# Patient Record
Sex: FEMALE | Race: WHITE | NOT HISPANIC OR LATINO | Employment: OTHER | URBAN - METROPOLITAN AREA
[De-identification: names, ages, dates, MRNs, and addresses within clinical notes are randomized per-mention and may not be internally consistent; named-entity substitution may affect disease eponyms.]

---

## 2020-09-04 ENCOUNTER — APPOINTMENT (OUTPATIENT)
Dept: RADIOLOGY | Facility: MEDICAL CENTER | Age: 65
End: 2020-09-04
Attending: EMERGENCY MEDICINE
Payer: MEDICARE

## 2020-09-04 ENCOUNTER — HOSPITAL ENCOUNTER (EMERGENCY)
Facility: MEDICAL CENTER | Age: 65
End: 2020-09-04
Attending: EMERGENCY MEDICINE
Payer: MEDICARE

## 2020-09-04 VITALS
TEMPERATURE: 98.2 F | BODY MASS INDEX: 20.31 KG/M2 | OXYGEN SATURATION: 96 % | DIASTOLIC BLOOD PRESSURE: 72 MMHG | HEIGHT: 63 IN | HEART RATE: 67 BPM | WEIGHT: 114.64 LBS | SYSTOLIC BLOOD PRESSURE: 139 MMHG | RESPIRATION RATE: 13 BRPM

## 2020-09-04 DIAGNOSIS — Z20.822 SUSPECTED COVID-19 VIRUS INFECTION: ICD-10-CM

## 2020-09-04 LAB
COVID ORDER STATUS COVID19: NORMAL
EKG IMPRESSION: NORMAL
SARS-COV-2 RNA RESP QL NAA+PROBE: DETECTED
SPECIMEN SOURCE: ABNORMAL

## 2020-09-04 PROCEDURE — A9270 NON-COVERED ITEM OR SERVICE: HCPCS | Performed by: EMERGENCY MEDICINE

## 2020-09-04 PROCEDURE — 93005 ELECTROCARDIOGRAM TRACING: CPT | Performed by: EMERGENCY MEDICINE

## 2020-09-04 PROCEDURE — 99284 EMERGENCY DEPT VISIT MOD MDM: CPT

## 2020-09-04 PROCEDURE — 71045 X-RAY EXAM CHEST 1 VIEW: CPT

## 2020-09-04 PROCEDURE — U0003 INFECTIOUS AGENT DETECTION BY NUCLEIC ACID (DNA OR RNA); SEVERE ACUTE RESPIRATORY SYNDROME CORONAVIRUS 2 (SARS-COV-2) (CORONAVIRUS DISEASE [COVID-19]), AMPLIFIED PROBE TECHNIQUE, MAKING USE OF HIGH THROUGHPUT TECHNOLOGIES AS DESCRIBED BY CMS-2020-01-R: HCPCS

## 2020-09-04 PROCEDURE — 700102 HCHG RX REV CODE 250 W/ 637 OVERRIDE(OP): Performed by: EMERGENCY MEDICINE

## 2020-09-04 PROCEDURE — C9803 HOPD COVID-19 SPEC COLLECT: HCPCS | Performed by: EMERGENCY MEDICINE

## 2020-09-04 RX ORDER — ONDANSETRON 4 MG/1
4 TABLET, ORALLY DISINTEGRATING ORAL EVERY 6 HOURS PRN
COMMUNITY
End: 2020-09-06

## 2020-09-04 RX ORDER — BUSPIRONE HYDROCHLORIDE 5 MG/1
10 TABLET ORAL 3 TIMES DAILY
COMMUNITY

## 2020-09-04 RX ORDER — DEXAMETHASONE 4 MG/1
8 TABLET ORAL ONCE
Status: COMPLETED | OUTPATIENT
Start: 2020-09-04 | End: 2020-09-04

## 2020-09-04 RX ORDER — CHOLECALCIFEROL (VITAMIN D3) 125 MCG
5000 CAPSULE ORAL DAILY
COMMUNITY

## 2020-09-04 RX ORDER — GUAIFENESIN 600 MG/1
600 TABLET, EXTENDED RELEASE ORAL EVERY 4 HOURS PRN
COMMUNITY
End: 2020-10-12

## 2020-09-04 RX ORDER — ALBUTEROL SULFATE 90 UG/1
2 AEROSOL, METERED RESPIRATORY (INHALATION) EVERY 6 HOURS PRN
COMMUNITY
End: 2020-10-12

## 2020-09-04 RX ADMIN — DEXAMETHASONE 8 MG: 4 TABLET ORAL at 17:18

## 2020-09-04 NOTE — ED PROVIDER NOTES
ED Provider  Scribed for Jv Sun D.O. by Anna Bhat. 9/4/2020  1:49 PM    Means of arrival:Walk in   History obtained from:Patient   History limited by: None     CHIEF COMPLAINT  Chief Complaint   Patient presents with   • Shortness of Breath   • Cough   • Cramping   • Rib Pain     right   • Fever     100.2 at home last night       HPI  Irma Vicente is a 65 y.o. female who presents with a cough onset 6 days ago. The patient states that her  tested positive for coronavirus 4 days ago. She reports associated shortness of breath, generalized body aches, fever, right-sided chest pain, and leg cramping. She denies vomiting. She also denies a history of heart or lung disease. She says that she has not had a coronavirus test done.     REVIEW OF SYSTEMS  See HPI for further details.    PAST MEDICAL HISTORY   has a past medical history of Anxiety, Constipation, DVT (deep venous thrombosis) (HCC), May-Thurner syndrome, and Osteoporosis.    SOCIAL HISTORY  Social History     Tobacco Use   • Smoking status: Never Smoker   • Smokeless tobacco: Never Used   Substance and Sexual Activity   • Alcohol use: Not Currently   • Drug use: Not Currently   • Sexual activity: Not noted        SURGICAL HISTORY  patient denies any surgical history    CURRENT MEDICATIONS  Home Medications     Reviewed by Ambreen De La Garza R.N. (Registered Nurse) on 09/04/20 at 1307  Med List Status: Partial   Medication Last Dose Status   Acetylcysteine (N-ACETYL-L-CYSTEINE) 600 MG Cap 9/4/2020 Active   albuterol 108 (90 Base) MCG/ACT Aero Soln inhalation aerosol prn Active   aspirin EC (ECOTRIN) 81 MG Tablet Delayed Response 9/4/2020 Active   busPIRone (BUSPAR) 5 MG tablet 9/4/2020 Active   cholecalciferol (D-3-5) 5000 UNIT Cap 9/4/2020 Active   guaiFENesin ER (MUCINEX) 600 MG TABLET SR 12 HR 9/4/2020 Active   ondansetron (ZOFRAN ODT) 4 MG TABLET DISPERSIBLE 9/4/2020 Active                ALLERGIES  No Known Allergies    PHYSICAL  "EXAM  VITAL SIGNS: /86   Pulse 75   Temp 37.2 °C (99 °F) (Temporal)   Resp 20   Ht 1.6 m (5' 3\")   Wt 52 kg (114 lb 10.2 oz)   SpO2 94%   BMI 20.31 kg/m²   Constitutional: Alert in no apparent distress.  HENT: No signs of trauma, mucous membranes are moist  Eyes: Conjunctiva normal, Non-icteric.   Neck: Normal range of motion, No tenderness, Supple.  Lymphatic: No lymphadenopathy noted.   Cardiovascular: Regular rate and rhythm, no murmurs.   Thorax & Lungs: Normal breath sounds, No respiratory distress, No wheezing, No chest tenderness.   Abdomen: Bowel sounds normal, Soft, No tenderness, No masses, No pulsatile masses. No peritoneal signs.  Skin: Warm, Dry, normal color.   Back: No bony tenderness, No CVA tenderness.   Extremities: No edema, No tenderness, No cyanosis  Musculoskeletal: Good range of motion in all major joints. No tenderness to palpation or major deformities noted.   Neurologic: Alert and oriented x4, Normal motor function, Normal sensory function, No focal deficits noted.   Psychiatric: Affect normal, Judgment normal, Mood normal.       DIAGNOSTIC STUDIES / PROCEDURES    EKG  12 Lead EKG interpreted by me shown below.      LABS  Results for orders placed or performed during the hospital encounter of 20   COVID/SARS CoV-2 PCR    Specimen: Nasopharyngeal; Respirate   Result Value Ref Range    COVID Order Status Received    SARS-CoV-2, PCR (In-House)   Result Value Ref Range    SARS-CoV-2 Source NP Swab     SARS-CoV-2 by PCR DETECTED (AA)    EKG   Result Value Ref Range    Report       [facility] Emergency Dept.    Test Date:  2020  Pt Name:    CASSIE CRYSTAL                Department: ER  MRN:        [id number]                      Room:  Gender:     Female                       Technician: [id number]  :        1955                   Requested By:ER TRIAGE PROTOCOL  Order #:    [id number]                    Reading MD: TRACY NOONAN, " MELVI    Measurements  Intervals                                Axis  Rate:       68                           P:          62  VA:         183                          QRS:        73  QRSD:       77                           T:          66  QT:         390  QTc:        415    Interpretive Statements  Sinus rhythm  No previous ECG available for comparison  Electronically Signed On 9-4-2020 17:22:37 PDT by JV NOONAN D.O.        All labs reviewed by me.    RADIOLOGY  DX-CHEST-PORTABLE (1 VIEW)   Final Result      No acute cardiopulmonary disease.        The radiologist's interpretations of all radiological studies have been reviewed by me.    Films have been independently by me      COURSE  Pertinent Labs & Imaging studies reviewed. (See chart for details)    1:49 PM - Patient seen and examined at bedside. Discussed plan of care. Ordered for DX-Chest and COVID/SARS CoV-2 to evaluate her symptoms.     PPE Note: I personally donned full PPE for all patient encounters during this visit, including being clean-shaven with an N95 respirator mask, gloves, gown, and goggles.     Scribe remained outside the patient's room and did not have any contact with the patient for the duration of patient encounter.     MEDICAL DECISION MAKING  This is a 65 y.o. female who presents with cough, subjective fevers, and she says she has some shortness of breath.  She has body aches and some chest discomfort.  Her evaluation is positive for coronavirus, her  was tested positive.  She is not hypoxic, her pulse oximetry is been normal she has no respiratory distress she has no pneumonia on x-ray.  With this she is stable for discharge home.  Dexamethasone was given.          FINAL IMPRESSION  1. Suspected COVID-19 virus infection         Anna GRANDE), am scribing for, and in the presence of, Jv Noonan D.O..    Electronically signed by: Anna Jolley), 9/4/2020    Jv GRANDE D.O.  personally performed the services described in this documentation, as scribed by Anna Bhat in my presence, and it is both accurate and complete. E    The note accurately reflects work and decisions made by me.  Jv Sun D.O.  9/4/2020  5:36 PM

## 2020-09-04 NOTE — ED NOTES
Pt ambulatory to GRN 23. Pt ambulatory to restroom and given clean catch instructions. Pt changed into gown and placed on monitor.  Agree with triage note.   Chart up and ready for ERP now.

## 2020-09-04 NOTE — ED TRIAGE NOTES
Pt ambulated to triage with   Chief Complaint   Patient presents with   • Shortness of Breath   • Cough   • Cramping   • Rib Pain     right   • Fever     100.2 at home last night     Pt symptoms started Saturday, positive for covid, pt been talking with PCP and albuterol started but pt hasn't started to take it yet.  Pt reports sharp in right side of chest when taking a deep breath and some chest tightness.   Called for EKG.  Pt Informed regarding triage process and verbalized understanding to inform triage tech or RN for any changes in condition. Placed in [facility] after EKG.

## 2020-09-04 NOTE — ED NOTES
Lab called with critical result of COVID-19 POSITIVE. Critical lab result read back to .   Dr. Sun notified of critical lab result at 1648.  Critical lab result read back by Dr. Sun.

## 2020-09-05 NOTE — ED NOTES
Pt discharged home. Explained discharge and medication instructions. Questions and comments addressed. Pt verbalized understanding of instructions. Pt advised to follow-up with PCP as needed or return to ED for any new or worsening of symptoms. Pt is ambulating well and steady on feet. VS stable.

## 2020-09-05 NOTE — DISCHARGE INSTRUCTIONS
Your COVID test is positive.  Your oxygen levels are good and you have no respiratory distress and a chest x-ray is normal.  Currently this is a mild case but this could change at any time.  Return if your symptoms worsen for reevaluation.    In the meantime you need to self isolate do not expose yourself to anyone else.,  As your  already has COVID exposing yourself to him would not be detrimental.

## 2020-09-06 ENCOUNTER — APPOINTMENT (OUTPATIENT)
Dept: RADIOLOGY | Facility: MEDICAL CENTER | Age: 65
End: 2020-09-06
Attending: EMERGENCY MEDICINE
Payer: MEDICARE

## 2020-09-06 ENCOUNTER — HOSPITAL ENCOUNTER (EMERGENCY)
Facility: MEDICAL CENTER | Age: 65
End: 2020-09-06
Attending: EMERGENCY MEDICINE
Payer: MEDICARE

## 2020-09-06 VITALS
RESPIRATION RATE: 22 BRPM | DIASTOLIC BLOOD PRESSURE: 80 MMHG | WEIGHT: 113 LBS | HEIGHT: 63 IN | OXYGEN SATURATION: 99 % | SYSTOLIC BLOOD PRESSURE: 139 MMHG | BODY MASS INDEX: 20.02 KG/M2 | HEART RATE: 76 BPM | TEMPERATURE: 97.1 F

## 2020-09-06 DIAGNOSIS — J12.82 PNEUMONIA DUE TO COVID-19 VIRUS: ICD-10-CM

## 2020-09-06 DIAGNOSIS — R07.9 CHEST PAIN, UNSPECIFIED TYPE: ICD-10-CM

## 2020-09-06 DIAGNOSIS — U07.1 PNEUMONIA DUE TO COVID-19 VIRUS: ICD-10-CM

## 2020-09-06 LAB
ALBUMIN SERPL BCP-MCNC: 4.1 G/DL (ref 3.2–4.9)
ALBUMIN/GLOB SERPL: 1.5 G/DL
ALP SERPL-CCNC: 91 U/L (ref 30–99)
ALT SERPL-CCNC: 34 U/L (ref 2–50)
ANION GAP SERPL CALC-SCNC: 15 MMOL/L (ref 7–16)
AST SERPL-CCNC: 51 U/L (ref 12–45)
BASOPHILS # BLD AUTO: 0.2 % (ref 0–1.8)
BASOPHILS # BLD: 0.01 K/UL (ref 0–0.12)
BILIRUB SERPL-MCNC: 0.3 MG/DL (ref 0.1–1.5)
BUN SERPL-MCNC: 13 MG/DL (ref 8–22)
CALCIUM SERPL-MCNC: 9.1 MG/DL (ref 8.5–10.5)
CHLORIDE SERPL-SCNC: 90 MMOL/L (ref 96–112)
CO2 SERPL-SCNC: 22 MMOL/L (ref 20–33)
CREAT SERPL-MCNC: 0.62 MG/DL (ref 0.5–1.4)
EKG IMPRESSION: NORMAL
EOSINOPHIL # BLD AUTO: 0.01 K/UL (ref 0–0.51)
EOSINOPHIL NFR BLD: 0.2 % (ref 0–6.9)
ERYTHROCYTE [DISTWIDTH] IN BLOOD BY AUTOMATED COUNT: 41.9 FL (ref 35.9–50)
GLOBULIN SER CALC-MCNC: 2.7 G/DL (ref 1.9–3.5)
GLUCOSE SERPL-MCNC: 111 MG/DL (ref 65–99)
HCT VFR BLD AUTO: 43.3 % (ref 37–47)
HGB BLD-MCNC: 15.1 G/DL (ref 12–16)
IMM GRANULOCYTES # BLD AUTO: 0.01 K/UL (ref 0–0.11)
IMM GRANULOCYTES NFR BLD AUTO: 0.2 % (ref 0–0.9)
LYMPHOCYTES # BLD AUTO: 0.52 K/UL (ref 1–4.8)
LYMPHOCYTES NFR BLD: 10.7 % (ref 22–41)
MCH RBC QN AUTO: 32.6 PG (ref 27–33)
MCHC RBC AUTO-ENTMCNC: 34.9 G/DL (ref 33.6–35)
MCV RBC AUTO: 93.5 FL (ref 81.4–97.8)
MONOCYTES # BLD AUTO: 0.23 K/UL (ref 0–0.85)
MONOCYTES NFR BLD AUTO: 4.7 % (ref 0–13.4)
NEUTROPHILS # BLD AUTO: 4.08 K/UL (ref 2–7.15)
NEUTROPHILS NFR BLD: 84 % (ref 44–72)
NRBC # BLD AUTO: 0 K/UL
NRBC BLD-RTO: 0 /100 WBC
PLATELET # BLD AUTO: 193 K/UL (ref 164–446)
PMV BLD AUTO: 10.1 FL (ref 9–12.9)
POTASSIUM SERPL-SCNC: 4.3 MMOL/L (ref 3.6–5.5)
PROT SERPL-MCNC: 6.8 G/DL (ref 6–8.2)
RBC # BLD AUTO: 4.63 M/UL (ref 4.2–5.4)
SODIUM SERPL-SCNC: 127 MMOL/L (ref 135–145)
WBC # BLD AUTO: 4.9 K/UL (ref 4.8–10.8)

## 2020-09-06 PROCEDURE — 700117 HCHG RX CONTRAST REV CODE 255: Performed by: EMERGENCY MEDICINE

## 2020-09-06 PROCEDURE — 80053 COMPREHEN METABOLIC PANEL: CPT

## 2020-09-06 PROCEDURE — 700105 HCHG RX REV CODE 258: Performed by: EMERGENCY MEDICINE

## 2020-09-06 PROCEDURE — 36415 COLL VENOUS BLD VENIPUNCTURE: CPT

## 2020-09-06 PROCEDURE — 93970 EXTREMITY STUDY: CPT

## 2020-09-06 PROCEDURE — 99285 EMERGENCY DEPT VISIT HI MDM: CPT

## 2020-09-06 PROCEDURE — 85025 COMPLETE CBC W/AUTO DIFF WBC: CPT

## 2020-09-06 PROCEDURE — 93970 EXTREMITY STUDY: CPT | Mod: 26 | Performed by: INTERNAL MEDICINE

## 2020-09-06 PROCEDURE — 71275 CT ANGIOGRAPHY CHEST: CPT

## 2020-09-06 PROCEDURE — 700111 HCHG RX REV CODE 636 W/ 250 OVERRIDE (IP): Performed by: EMERGENCY MEDICINE

## 2020-09-06 PROCEDURE — 93005 ELECTROCARDIOGRAM TRACING: CPT

## 2020-09-06 PROCEDURE — 93005 ELECTROCARDIOGRAM TRACING: CPT | Performed by: EMERGENCY MEDICINE

## 2020-09-06 PROCEDURE — 96374 THER/PROPH/DIAG INJ IV PUSH: CPT

## 2020-09-06 RX ORDER — ONDANSETRON 4 MG/1
4 TABLET, ORALLY DISINTEGRATING ORAL EVERY 8 HOURS PRN
Qty: 10 TAB | Refills: 0 | Status: SHIPPED | OUTPATIENT
Start: 2020-09-06 | End: 2020-10-12

## 2020-09-06 RX ORDER — ONDANSETRON 4 MG/1
4 TABLET, ORALLY DISINTEGRATING ORAL EVERY 8 HOURS PRN
Qty: 10 TAB | Refills: 0 | Status: SHIPPED | OUTPATIENT
Start: 2020-09-06 | End: 2020-09-06 | Stop reason: SDUPTHER

## 2020-09-06 RX ORDER — ONDANSETRON 2 MG/ML
4 INJECTION INTRAMUSCULAR; INTRAVENOUS ONCE
Status: COMPLETED | OUTPATIENT
Start: 2020-09-06 | End: 2020-09-06

## 2020-09-06 RX ORDER — SODIUM CHLORIDE 9 MG/ML
1000 INJECTION, SOLUTION INTRAVENOUS ONCE
Status: COMPLETED | OUTPATIENT
Start: 2020-09-06 | End: 2020-09-06

## 2020-09-06 RX ADMIN — IOHEXOL 60 ML: 350 INJECTION, SOLUTION INTRAVENOUS at 13:58

## 2020-09-06 RX ADMIN — ONDANSETRON 4 MG: 2 INJECTION INTRAMUSCULAR; INTRAVENOUS at 13:00

## 2020-09-06 RX ADMIN — SODIUM CHLORIDE 1000 ML: 9 INJECTION, SOLUTION INTRAVENOUS at 15:23

## 2020-09-06 ASSESSMENT — PAIN DESCRIPTION - PAIN TYPE: TYPE: ACUTE PAIN

## 2020-09-06 NOTE — ED NOTES
Pt ambulated to restroom with one assist. Pt then was transported to CT for imaging. Pt reported zofran helped with nausea

## 2020-09-06 NOTE — ED PROVIDER NOTES
ED Provider Note    This patient was cared for during the COVID-19 pandemic. History and physical exam may be limited/truncated by the inherent challenges of PPE and the need to decrease staff exposure to novel coronavirus. Some aspects of disease management may be different to protect staff and help slow the spread of disease. I verified that, if possible, the patient was wearing a mask and I was wearing appropriate PPE every time I encountered the patient.       CHIEF COMPLAINT  Chief Complaint   Patient presents with   • Chest Pain     x's 2 days on exhalation   • Chest Pressure     x's 2 days chest pressure and tightness constant. reports headache and non productive cough.    • Nausea     x's 3 days        HPI  Irma Vicente is a 65 y.o. female who presents with chest pain nausea.  She was seen 2 days ago and was diagnosed covid at that time.  Her  also is positive for covid.  She started to develop chest pain and pressure.  She has a history of DVT and history of May Thurner syndrome status post venous stents.  She is concerned given her chest pain for DVT and PE.  She has pain in her chest that is worse when she breathes.  It is located around the right side of her chest.  She denies any worsening lower extremity edema.        REVIEW OF SYSTEMS  positive for chest pain cough fevers, negative for vomiting. All other systems are negative.     PAST MEDICAL HISTORY   has a past medical history of Anxiety, Constipation, DVT (deep venous thrombosis) (HCC), May-Thurner syndrome, and Osteoporosis.    SOCIAL HISTORY  Social History     Tobacco Use   • Smoking status: Never Smoker   • Smokeless tobacco: Never Used   Substance and Sexual Activity   • Alcohol use: Not Currently   • Drug use: Not Currently   • Sexual activity: Not on file       SURGICAL HISTORY  patient denies any surgical history    CURRENT MEDICATIONS  Home Medications     Reviewed by Love Singh R.N. (Registered Nurse) on 09/06/20 at 1135   "Med List Status: Not Addressed   Medication Last Dose Status   Acetylcysteine (N-ACETYL-L-CYSTEINE) 600 MG Cap  Active   albuterol 108 (90 Base) MCG/ACT Aero Soln inhalation aerosol  Active   aspirin EC (ECOTRIN) 81 MG Tablet Delayed Response  Active   busPIRone (BUSPAR) 5 MG tablet  Active   cholecalciferol (D-3-5) 5000 UNIT Cap  Active   guaiFENesin ER (MUCINEX) 600 MG TABLET SR 12 HR  Active   ondansetron (ZOFRAN ODT) 4 MG TABLET DISPERSIBLE  Active                ALLERGIES  No Known Allergies    PHYSICAL EXAM  VITAL SIGNS: /80   Pulse 76   Temp 36.2 °C (97.1 °F)   Resp (!) 22   Ht 1.6 m (5' 3\")   Wt 51.3 kg (113 lb)   SpO2 99%   BMI 20.02 kg/m² .  Constitutional: Alert in no apparent distress.  HENT: No signs of trauma, Bilateral external ears normal, Nose normal.   Eyes: Pupils are equal and reactive, Conjunctiva normal, Non-icteric.   Neck: Normal range of motion, No tenderness, Supple, No stridor.   Cardiovascular: Regular rate and rhythm, no murmurs.   Thorax & Lungs: Normal breath sounds, No respiratory distress, No wheezing, No chest tenderness.   Abdomen: Bowel sounds normal, Soft, No tenderness, No masses, No peritoneal signs.  Skin: Warm, Dry, No erythema, No rash.   Musculoskeletal:  no major deformities noted.   Neurologic: Alert,  No focal deficits noted.   Psychiatric: Affect normal, Judgment normal, Mood normal.       DIAGNOSTIC STUDIES / PROCEDURES      EKG  Results for orders placed or performed during the hospital encounter of 20   EKG   Result Value Ref Range    Report       [facility] Emergency Dept.    Test Date:  2020  Pt Name:    CASSIE ROJAS                Department: ER  MRN:        [id number]                      Room:       [facility]  Gender:     Female                       Technician: [id number]  :        1955                   Requested By:ER TRIAGE PROTOCOL  Order #:    [id number]                    Alex MD: DEVYN " ALINA    Measurements  Intervals                                Axis  Rate:       65                           P:          48  FL:         188                          QRS:        71  QRSD:       76                           T:          57  QT:         380  QTc:        396    Interpretive Statements  SINUS RHYTHM  Compared to ECG 09/04/2020 14:39:21  No significant changes  No acute ischemia  Electronically Signed On 9-6-2020 16:14:10 PDT by DEVYN FULLER           LABS  Labs Reviewed   CBC WITH DIFFERENTIAL - Abnormal; Notable for the following components:       Result Value    Neutrophils-Polys 84.00 (*)     Lymphocytes 10.70 (*)     Lymphs (Absolute) 0.52 (*)     All other components within normal limits    Narrative:     Droplet, Contact, and Eye Protection   COMP METABOLIC PANEL - Abnormal; Notable for the following components:    Sodium 127 (*)     Chloride 90 (*)     Glucose 111 (*)     AST(SGOT) 51 (*)     All other components within normal limits    Narrative:     Droplet, Contact, and Eye Protection   ESTIMATED GFR    Narrative:     Droplet, Contact, and Eye Protection       RADIOLOGY  US-EXTREMITY VENOUS LOWER BILAT   Final Result      CT-CTA CHEST PULMONARY ARTERY W/ RECONS   Final Result      1.  No CT evidence for pulmonary emboli.   2.  Multifocal patchy groundglass opacities consistent with Covid 19 pneumonia.   3.  Hyperinflation, likely indicating COPD.                Medical records reviewed for continuity of care.  Patient was seen 2 days ago for cough.  She was covid positive at that time.    Differential Diagnosis includes but is not limited to: COVID-19 pneumonia, PE, DVT    COURSE & MEDICAL DECISION MAKING  Pertinent Labs & Imaging studies reviewed. (See chart for details)    This is a 65-year-old female that presents with chest pain.  She is known to be covid positive.  She is now 9 days into her symptoms.  She is on room air and is satting 95 to 96% on room air.  She is in no respiratory  distress.  She does have a history of DVT and PE secondary to may Thurner syndrome.  And therefore was concern for DVT/PE in the setting of this COVID-19 infection.    Labs are obtained she does appear to be slightly dehydrated and therefore she was given IV fluids.  CT of the chest and ultrasound of the lower extremities was performed to evaluate for DVT and PE given her high risk status and known COVID-19 infection I did not think d-dimer would be helpful in this situation.  CT and ultrasounds are negative for acute clot.  CT does show evidence of groundglass opacities consistent with COVID-19 pneumonia.  Again she is maintaining adequate oxygenation on room air.  She has not tachypneic.  I do not think she meets criteria for admission to the hospital for her COVID-19 pneumonia.  I recommended continued supportive care and I do think she can be discharged.    HYDRATION: Based on the patient's presentation of Dehydration the patient was given IV fluids. IV Hydration was used because oral hydration was not adequate alone. Upon recheck following hydration, the patient was improved.          FINAL IMPRESSION  1. Pneumonia due to COVID-19 virus     2. Chest pain, unspecified type         2.   3.    This dictation has been creating using voice recognition software. The accuracy of the dictation is limited the abilities of the software.  I expect there may be some errors of grammar and possibly content. I made every attempt to manually correct the errors within my dictation. However errors related to this voice recognition software may still exist and should be interpreted within the appropriate context.      The note accurately reflects work and decisions made by me.  Misty Jung M.D.  9/6/2020  4:16 PM

## 2020-09-06 NOTE — ED TRIAGE NOTES
..  Chief Complaint   Patient presents with   • Chest Pain     x's 2 days on exhalation   • Chest Pressure     x's 2 days chest pressure and tightness constant. reports headache and non productive cough.    • Nausea     x's 3 days        Pt was diagnosed with covid

## 2020-10-12 ENCOUNTER — OFFICE VISIT (OUTPATIENT)
Dept: PULMONOLOGY | Facility: HOSPICE | Age: 65
End: 2020-10-12
Payer: MEDICARE

## 2020-10-12 ENCOUNTER — HOSPITAL ENCOUNTER (OUTPATIENT)
Dept: RADIOLOGY | Facility: MEDICAL CENTER | Age: 65
End: 2020-10-12
Payer: MEDICARE

## 2020-10-12 VITALS
BODY MASS INDEX: 20.42 KG/M2 | SYSTOLIC BLOOD PRESSURE: 106 MMHG | OXYGEN SATURATION: 95 % | DIASTOLIC BLOOD PRESSURE: 76 MMHG | HEART RATE: 75 BPM | HEIGHT: 63 IN | WEIGHT: 115.25 LBS

## 2020-10-12 DIAGNOSIS — R07.89 CHEST TIGHTNESS: ICD-10-CM

## 2020-10-12 DIAGNOSIS — U07.1 PNEUMONIA DUE TO COVID-19 VIRUS: ICD-10-CM

## 2020-10-12 DIAGNOSIS — J12.82 PNEUMONIA DUE TO COVID-19 VIRUS: ICD-10-CM

## 2020-10-12 DIAGNOSIS — R93.89 ABNORMAL CT OF THE CHEST: ICD-10-CM

## 2020-10-12 PROCEDURE — 99203 OFFICE O/P NEW LOW 30 MIN: CPT | Performed by: INTERNAL MEDICINE

## 2020-10-12 ASSESSMENT — FIBROSIS 4 INDEX: FIB4 SCORE: 2.95

## 2020-10-12 NOTE — PROGRESS NOTES
Chief Complaint   Patient presents with   • Establish Care     Referred by Andrea Floyd DO for COVID-19, due to pneumonia to 2019-nCoV   • Other     Labs 09/2020, CT-CTA Chest 09/06/20, CXR 09/04/20       Problems:   1. Abnormal CT of the chest    2. Pneumonia due to COVID-19 virus    3. Chest tightness        Assessment/Plan:   # Abnormal CT chest   -- Review of CT chest showed biapical scarring, and patchy periphery GGO in lower lobes with dense consolidation at RLL.   -- Would contribute patchy periphery GGO to COVID-19. Other differentials include inflammatory lung disease.   -- Biapical scarring is likely related to mechanical stress as it is greatest at the apices vs TB    -- Will obtain repeat CT chest    -- Check quantiferon test    -- RTC 4 weeks     # Pleuritic chest pain    -- Likely related to post COVID inflammatory process from irritation of the parietal pleura given CT findings of periphery GGO   -- Repeat CT chest as above      # Chest tightness   -- Concern for post viral induced bronchospasm vs airway reactive disease   -- Obtain PFTs    -- RTC 4 week   -- Need flu vaccine in the upcoming weeks given recent completion of steroids therapy     RTC 4 weeks     HPI:  Irma Vicente is a 65 year old female who is referred to Pulmonary clinic for evaluation of post COVID pneumonia. She is is a never smoker. Her medical history is notable for May-Thurner syndrome, DVT not on anticoagulation, and anxiety. She developed fatigue, shortness of breath, chest pain on 08/29 and was diagnosed with COVID pneumonia on 9/4. CXR on 09/04 personally reviewed -> no dense consolidation. She presented back to the ER on 9/6 with complaints of worsening pleuritic chest pain. CTA chest personally reviewed and revealed biapical scarring, and patchy periphery GGO in lower lobes with dense consolidation at RLL. She received decadron X1 in the ED and was discharged home. She was evaluated her PCP on 9/29 and state having chest  tightness and chest pain on deep inspiration. Repeat CXR per report showed increased opacification fo the posterior aspect of the lung bases. She was started on a 10 days course of steroids which she completed a few days ago.     Subjectively, she reports having chest tightness around mid chest. She feels like her left sided pleuritic chest pain has imrpoved with steroids. Denies cough, shortness of breath, fever/chills, N/V, or nasal congestion. Her appetite is good.     No reported family history of asthma, COPD, autoimmune disease, lung disease. No known chemical or TB exposure in the past. She has ducks and birds outside of her home. Never been diagnosed with asthma or COPD. She was given a trial of albuterol when she was diagnosed with COVID which her diastolic BP dropped to the 50s and felt dizzy which she stopped.         Past Medical History:   Diagnosis Date   • Anxiety    • Constipation    • DVT (deep venous thrombosis) ([facility])    • May-Thurner syndrome    • Osteoporosis        No past surgical history on file.    ROS:   Constitutional: Denies fevers, chills, night sweats, fatigue or weight loss  Eyes: Denies vision loss, pain, drainage, double vision  Ears, Nose, Throat: Denies earache, tinnitus, hoarseness  Cardiovascular: Denies palpitations  Respiratory: See HPI  GI: Denies abdominal pain, nausea, vomiting, diarrhea  : Denies frequent urination, hematuria, painful urination  Musculoskeletal: Denies back pain, painful joints, sore muscles  Neurological: Denies headaches, seizures  Skin: Denies rashes, color changes  Psychiatric: Denies depression or thoughts of suicide  Hematologic: Denies bleeding tendency or clotting tendency  Allergic/Immunologic: Denies rhinitis, skin sensitivity    Social History     Socioeconomic History   • Marital status:      Spouse name: Not on file   • Number of children: Not on file   • Years of education: Not on file   • Highest education level: Not on file    Occupational History   • Not on file   Social Needs   • Financial resource strain: Not on file   • Food insecurity     Worry: Not on file     Inability: Not on file   • Transportation needs     Medical: Not on file     Non-medical: Not on file   Tobacco Use   • Smoking status: Never Smoker   • Smokeless tobacco: Never Used   Substance and Sexual Activity   • Alcohol use: Not Currently   • Drug use: Not Currently   • Sexual activity: Not on file   Lifestyle   • Physical activity     Days per week: Not on file     Minutes per session: Not on file   • Stress: Not on file   Relationships   • Social connections     Talks on phone: Not on file     Gets together: Not on file     Attends Islam service: Not on file     Active member of club or organization: Not on file     Attends meetings of clubs or organizations: Not on file     Relationship status: Not on file   • Intimate partner violence     Fear of current or ex partner: Not on file     Emotionally abused: Not on file     Physically abused: Not on file     Forced sexual activity: Not on file   Other Topics Concern   • Not on file   Social History Narrative   • Not on file     Patient has no known allergies.  Current Outpatient Medications on File Prior to Visit   Medication Sig Dispense Refill   • ondansetron (ZOFRAN ODT) 4 MG TABLET DISPERSIBLE Take 1 Tab by mouth every 8 hours as needed. 10 Tab 0   • busPIRone (BUSPAR) 5 MG tablet Take 7.5 mg by mouth 3 times a day.     • guaiFENesin ER (MUCINEX) 600 MG TABLET SR 12 HR Take 600 mg by mouth every four hours as needed.     • Acetylcysteine (N-ACETYL-L-CYSTEINE) 600 MG Cap Take  by mouth 2 Times a Day.     • cholecalciferol (D-3-5) 5000 UNIT Cap Take 5,000 Units by mouth every day.     • aspirin EC (ECOTRIN) 81 MG Tablet Delayed Response Take 81 mg by mouth every day.     • albuterol 108 (90 Base) MCG/ACT Aero Soln inhalation aerosol Inhale 2 Puffs by mouth every 6 hours as needed for Shortness of Breath.        No current facility-administered medications on file prior to visit.      There were no vitals taken for this visit.  No family history on file.    There is no immunization history on file for this patient.      Physical Exam:  General: NAD. Speaking in full sentence.    HEENT: PERRLA, EOMI, no scleral icterus, no nasal or oral lesions  Neck: No thyromegaly, no adenopathy, no bruits  Mallampatti: Grade I  Lungs: Equal breath sounds, no wheezes or crackles  Heart: Regular rate and rhythm, no gallops or murmurs  Abdomen: Soft, benign, no organomegaly  Extremities: No clubbing, cyanosis, or edema  Neurologic: Cranial nerve, motor, and sensory exam are normal    Venancio Escamilla MD   Pulmonary Critical Care   [facility]

## 2020-11-02 ENCOUNTER — HOSPITAL ENCOUNTER (OUTPATIENT)
Dept: LAB | Facility: MEDICAL CENTER | Age: 65
End: 2020-11-02
Attending: INTERNAL MEDICINE
Payer: MEDICARE

## 2020-11-02 ENCOUNTER — HOSPITAL ENCOUNTER (OUTPATIENT)
Dept: RADIOLOGY | Facility: MEDICAL CENTER | Age: 65
End: 2020-11-02
Attending: INTERNAL MEDICINE
Payer: MEDICARE

## 2020-11-02 DIAGNOSIS — R93.89 ABNORMAL CT OF THE CHEST: ICD-10-CM

## 2020-11-02 DIAGNOSIS — U07.1 PNEUMONIA DUE TO COVID-19 VIRUS: ICD-10-CM

## 2020-11-02 DIAGNOSIS — J12.82 PNEUMONIA DUE TO COVID-19 VIRUS: ICD-10-CM

## 2020-11-02 PROCEDURE — 86480 TB TEST CELL IMMUN MEASURE: CPT

## 2020-11-02 PROCEDURE — 71250 CT THORAX DX C-: CPT

## 2020-11-02 PROCEDURE — 36415 COLL VENOUS BLD VENIPUNCTURE: CPT

## 2020-11-04 LAB
GAMMA INTERFERON BACKGROUND BLD IA-ACNC: 0.04 IU/ML
M TB IFN-G BLD-IMP: NEGATIVE
M TB IFN-G CD4+ BCKGRND COR BLD-ACNC: 0 IU/ML
MITOGEN IGNF BCKGRD COR BLD-ACNC: >10 IU/ML
QFT TB2 - NIL TBQ2: 0 IU/ML

## 2020-11-09 ENCOUNTER — HOSPITAL ENCOUNTER (OUTPATIENT)
Dept: PULMONOLOGY | Facility: MEDICAL CENTER | Age: 65
End: 2020-11-09
Attending: INTERNAL MEDICINE
Payer: MEDICARE

## 2020-11-09 DIAGNOSIS — R07.89 CHEST TIGHTNESS: ICD-10-CM

## 2020-11-09 PROCEDURE — 94726 PLETHYSMOGRAPHY LUNG VOLUMES: CPT

## 2020-11-09 PROCEDURE — 94010 BREATHING CAPACITY TEST: CPT

## 2020-11-09 PROCEDURE — 94729 DIFFUSING CAPACITY: CPT

## 2020-11-09 ASSESSMENT — PULMONARY FUNCTION TESTS
FVC_PERCENT_PREDICTED: 109
FEV1/FVC_PERCENT_PREDICTED: 79
FVC_LLN: 2.43
FEV1/FVC_PREDICTED: 78.88
FEV1/FVC_PERCENT_PREDICTED: 89
FEV1/FVC_PERCENT_LLN: 65.86
FEV1: 2.26
FEV1/FVC: 71
FEV1/FVC: 70.78
FVC_PREDICTED: 2.9
FEV1/FVC_PERCENT_PREDICTED: 91
FEV1_PERCENT_PREDICTED: 99
FEV1_LLN: 1.90
FVC: 3.19
FEV1_PREDICTED: 2.28

## 2020-11-11 ENCOUNTER — OFFICE VISIT (OUTPATIENT)
Dept: SLEEP MEDICINE | Facility: MEDICAL CENTER | Age: 65
End: 2020-11-11
Payer: MEDICARE

## 2020-11-11 VITALS
HEIGHT: 63 IN | WEIGHT: 115.44 LBS | OXYGEN SATURATION: 97 % | DIASTOLIC BLOOD PRESSURE: 80 MMHG | HEART RATE: 68 BPM | BODY MASS INDEX: 20.45 KG/M2 | SYSTOLIC BLOOD PRESSURE: 122 MMHG

## 2020-11-11 DIAGNOSIS — Z86.16 HISTORY OF 2019 NOVEL CORONAVIRUS DISEASE (COVID-19): ICD-10-CM

## 2020-11-11 DIAGNOSIS — R91.8 PULMONARY NODULES: ICD-10-CM

## 2020-11-11 DIAGNOSIS — J98.09: ICD-10-CM

## 2020-11-11 PROCEDURE — 99213 OFFICE O/P EST LOW 20 MIN: CPT | Performed by: INTERNAL MEDICINE

## 2020-11-11 ASSESSMENT — ENCOUNTER SYMPTOMS
GASTROINTESTINAL NEGATIVE: 1
NEUROLOGICAL NEGATIVE: 1
MUSCULOSKELETAL NEGATIVE: 1
CARDIOVASCULAR NEGATIVE: 1
RESPIRATORY NEGATIVE: 1
EYES NEGATIVE: 1
PSYCHIATRIC NEGATIVE: 1
CONSTITUTIONAL NEGATIVE: 1

## 2020-11-11 ASSESSMENT — FIBROSIS 4 INDEX: FIB4 SCORE: 2.95

## 2020-11-11 NOTE — ASSESSMENT & PLAN NOTE
Symptoms have resolved  No emphysema on CT but she is hyperinflated   + airtrapping  No symptoms so need for treatment

## 2020-11-11 NOTE — ASSESSMENT & PLAN NOTE
Diffuse  No evidence of relapsing polychondritis  No sputum or recurrent pneumonias  At this time unclear if need for work up unless symptoms  Discussed with pt that we could proceed with autoimmune work , bronchoscopy etc. At this time as pt is asymptomatic she does not want any additional work up

## 2020-11-11 NOTE — PROGRESS NOTES
Pulmonary Clinic follow up    Date of Service: 11/11/2020    Reason for follow up:  Follow-Up (Chest tightness/abnormal CT of the chest. Last seen 10/12/20) and Results (Labs 11/04/20, PFT 11/09/20, CT-Chest 11/02/200)      Problem List Items Addressed This Visit     History of 2019 novel coronavirus disease (COVID-19)     Symptoms have resolved  No emphysema on CT but she is hyperinflated   + airtrapping  No symptoms so need for treatment         Pulmonary nodules     LLL subpleural lobulated with some calcium  Largest is 8 mm  [facility] notes 2017 + lobulated nodule but fluctuating per ct report  She is a never smoker  Repeat CT in one year         Relevant Orders    CT-CHEST (THORAX) W/O    Calcification of bronchial airway     Diffuse  No evidence of relapsing polychondritis  No sputum or recurrent pneumonias  At this time unclear if need for work up unless symptoms  Discussed with pt that we could proceed with autoimmune work , bronchoscopy etc. At this time as pt is asymptomatic she does not want any additional work up             Pneumovax 23 uptodate  Need to clarify if she received prevnar    History of Present Illness: Irma Vicente is a 65 y.o. female with a past medical history of  COVID pna sep 2020. Never smoker.   Patient seen by Dr. Escamilla on 10/12 please see his note for details re: COVID pna and her symptoms.  She has improved since her last visit  She started walking daily and has been feeling better with no symptoms at this time  CT chest GG opacities have resolved  PFT showed hyperinflation and also air trapping as well as lobulated pulm nodule last seen at [facility] in 2017 but at that time comment was it had been moving    Review of Systems   Constitutional: Negative.    HENT: Negative.    Eyes: Negative.    Respiratory: Negative.    Cardiovascular: Negative.    Gastrointestinal: Negative.    Genitourinary: Negative.    Musculoskeletal: Negative.    Skin: Negative.    Neurological: Negative.     Endo/Heme/Allergies: Negative.    Psychiatric/Behavioral: Negative.        Current Outpatient Medications on File Prior to Visit   Medication Sig Dispense Refill   • Omega-3 Fatty Acids (OMEGA 3 PO) Take  by mouth 2 Times a Day.     • Amino Acids (AMINO ACID PO) Take  by mouth every day.     • Ascorbic Acid (VITAMIN C PO) Take  by mouth every day.     • Multiple Vitamins-Minerals (ZINC PO) Take  by mouth every day.     • busPIRone (BUSPAR) 5 MG tablet Take 7.5 mg by mouth 3 times a day.     • cholecalciferol (D-3-5) 5000 UNIT Cap Take 5,000 Units by mouth every day.     • aspirin EC (ECOTRIN) 81 MG Tablet Delayed Response Take 81 mg by mouth every day.       No current facility-administered medications on file prior to visit.        Social History     Tobacco Use   • Smoking status: Never Smoker   • Smokeless tobacco: Never Used   Substance Use Topics   • Alcohol use: Not Currently   • Drug use: Not Currently        Past Medical History:   Diagnosis Date   • Anxiety    • Chest tightness    • Constipation    • DVT (deep venous thrombosis) (HCC)    • May-Thurner syndrome    • Osteoporosis      Immunization History   Administered Date(s) Administered   • Influenza (IM) Preservative Free - HISTORICAL DATA 10/01/2016   • Influenza Seasonal Injectable - Historical Data 10/07/2015, 11/15/2017   • Influenza Vaccine Adult HD 10/01/2019   • Influenza Vaccine Pediatric Split - Historical Data 10/01/2011, 11/25/2013, 02/03/2015   • Influenza Vaccine Quad Inj (Pf) 10/20/2020   • Influenza Vaccine Quad Recombinant 10/16/2018   • Influenza, Unspecified - HISTORICAL DATA 10/22/2019   • Pneumococcal polysaccharide vaccine (PPSV-23) 08/21/2020   • Tdap Vaccine 09/07/2012   • Zoster Vaccine Live (ZVL) (Zostavax) - HISTORICAL DATA 06/05/2012   • Zoster Vaccine Recombinant (RZV) (SHINGRIX) 07/17/2020, 10/29/2020       History reviewed. No pertinent surgical history.    Allergies: Patient has no known allergies.    History reviewed. No  "pertinent family history.    Vitals:    11/11/20 0848   Height: 1.6 m (5' 3\")   Weight: 52.4 kg (115 lb 7 oz)   Weight % change since last entry.: 0 %   BP: 122/80   Pulse: 68   BMI (Calculated): 20.45       Physical Examination  Physical Exam   Constitutional: She is oriented to person, place, and time. No distress.   HENT:   Head: Normocephalic and atraumatic.   Right Ear: External ear normal.   Left Ear: External ear normal.   Mouth/Throat: Oropharynx is clear and moist.   Eyes: Pupils are equal, round, and reactive to light. Conjunctivae and EOM are normal.   Neck: Normal range of motion. Neck supple. No JVD present. No tracheal deviation present. No thyromegaly present.   Cardiovascular: Normal rate, regular rhythm and intact distal pulses. Exam reveals no gallop and no friction rub.   No murmur heard.  Pulmonary/Chest: No stridor. No respiratory distress. She has no wheezes. She has no rales. She exhibits no tenderness.   At two specific points lower lobe insp crackles but only at particular points improves a little with coughing   Abdominal: Soft. Bowel sounds are normal.   Musculoskeletal:         General: No tenderness, deformity or edema.   Lymphadenopathy:     She has no cervical adenopathy.   Neurological: She is alert and oriented to person, place, and time. No cranial nerve deficit. Gait normal. Coordination normal. GCS score is 15.   Skin: Skin is warm and dry. No rash noted. She is not diaphoretic.   Psychiatric: Mood, affect and judgment normal.         Results:    Pulmonary function tests  Acceptable and reproducible  FEV1 2.26 l (99%), FVC 3.19 l (109%), ratio 70%, TLC 6.14 l (125%), DLCO 20.65 ml/min/mmhg (107%)  Flow volume loops convcavity of the exp loop  Air trapping and hyperinflation  Other pertinent testing:  CT chest done on 11/2/20 personally viewed GG opacities resolved.  Does have 2 pulm nodule 8.4 mm and 5.8 mm nodules in the LLL  hyperexpanded lungs but di dnot appreciate " emphysematous changes  Calcified lymh nodes and calcified bronchi  LLL lobulated nodule 8 m and 5 mm that was present in 2017 (See [facility] CT)         Sharita Ashby M.D., MD MPH [facility]  [facility]

## 2020-11-11 NOTE — ASSESSMENT & PLAN NOTE
LLL subpleural lobulated with some calcium  Largest is 8 mm  [facility] notes 2017 + lobulated nodule but fluctuating per ct report  She is a never smoker  Repeat CT in one year

## 2020-11-15 PROCEDURE — 94060 EVALUATION OF WHEEZING: CPT | Mod: 26 | Performed by: INTERNAL MEDICINE

## 2020-11-15 PROCEDURE — 94726 PLETHYSMOGRAPHY LUNG VOLUMES: CPT | Mod: 26 | Performed by: INTERNAL MEDICINE

## 2020-11-15 PROCEDURE — 94729 DIFFUSING CAPACITY: CPT | Mod: 26 | Performed by: INTERNAL MEDICINE

## 2020-11-15 NOTE — PROCEDURES
PULMONARY FUNCTION TEST INTERPRETATION    On spirometry, the patient only had pre-bronchodilator response recorded.    This is because the patient refused albuterol inhaler because having problems   with any inhalers in the past.    Patient's FEV1/FVC ratio was 70% of predicted, which is just at the borderline   of being diagnosed with obstructive lung disease.    Lung volumes showed an increased residual volume of 146% predicted and a total   lung capacity increased at 125% predicted.  This is associated with   hyperinflation and air trapping in the setting of obstructive lung disease.    Patient's diffusion capacity is normal at 107% predicted.    Patient's oxygen saturation on this encounter was 99% predicted.    SUMMARY:  In summary, I feel that this patient has obstructive lung disease   with hyperinflation and air trapping.  Follow up with pulmonologist as   requested.       ____________________________________     MD GENNA Doshi / SOINDO    DD:  11/15/2020 13:35:22  DT:  11/15/2020 14:09:24    D#:  [id number]  Job#:  [id number]

## 2021-09-09 ENCOUNTER — HOSPITAL ENCOUNTER (OUTPATIENT)
Dept: RADIOLOGY | Facility: MEDICAL CENTER | Age: 66
End: 2021-09-09
Attending: SURGERY
Payer: MEDICARE

## 2021-09-09 DIAGNOSIS — I87.1 COMPRESSION OF VEIN: ICD-10-CM

## 2021-09-09 PROCEDURE — 93971 EXTREMITY STUDY: CPT | Mod: 26,LT | Performed by: INTERNAL MEDICINE

## 2021-09-09 PROCEDURE — 93971 EXTREMITY STUDY: CPT | Mod: LT

## 2021-11-11 ENCOUNTER — HOSPITAL ENCOUNTER (OUTPATIENT)
Dept: RADIOLOGY | Facility: MEDICAL CENTER | Age: 66
End: 2021-11-11
Attending: INTERNAL MEDICINE
Payer: MEDICARE

## 2021-11-11 DIAGNOSIS — R91.8 PULMONARY NODULES: ICD-10-CM

## 2021-11-11 PROCEDURE — 71250 CT THORAX DX C-: CPT | Mod: ME

## 2022-06-07 ENCOUNTER — HOSPITAL ENCOUNTER (OUTPATIENT)
Dept: RADIOLOGY | Facility: MEDICAL CENTER | Age: 67
End: 2022-06-07
Attending: PHYSICAL MEDICINE & REHABILITATION
Payer: MEDICARE

## 2022-06-07 DIAGNOSIS — M54.2 CERVICALGIA: ICD-10-CM

## 2022-06-07 PROCEDURE — 72141 MRI NECK SPINE W/O DYE: CPT | Mod: MH

## 2022-06-22 ENCOUNTER — RESEARCH ENCOUNTER (OUTPATIENT)
Dept: MEDICAL GROUP | Facility: PHYSICIAN GROUP | Age: 67
End: 2022-06-22
Payer: MEDICARE

## 2022-06-22 DIAGNOSIS — Z00.6 RESEARCH STUDY PATIENT: ICD-10-CM

## 2022-07-15 ENCOUNTER — HOSPITAL ENCOUNTER (OUTPATIENT)
Dept: RADIOLOGY | Facility: MEDICAL CENTER | Age: 67
End: 2022-07-15
Attending: INTERNAL MEDICINE
Payer: MEDICARE

## 2022-07-15 DIAGNOSIS — D70.8 CHRONIC BENIGN NEUTROPENIA (HCC): ICD-10-CM

## 2022-07-15 PROCEDURE — 76700 US EXAM ABDOM COMPLETE: CPT

## 2022-07-18 LAB
APOB+LDLR+PCSK9 GENE MUT ANL BLD/T: NOT DETECTED
BRCA1+BRCA2 DEL+DUP + FULL MUT ANL BLD/T: NOT DETECTED
MLH1+MSH2+MSH6+PMS2 GN DEL+DUP+FUL M: NOT DETECTED

## 2022-08-31 ENCOUNTER — HOSPITAL ENCOUNTER (OUTPATIENT)
Dept: RADIOLOGY | Facility: MEDICAL CENTER | Age: 67
End: 2022-08-31
Attending: SURGERY
Payer: MEDICARE

## 2022-08-31 DIAGNOSIS — I82.409 ACUTE EMBOLISM AND THROMBOSIS OF DEEP VEIN OF LOWER EXTREMITY, UNSPECIFIED LATERALITY (HCC): ICD-10-CM

## 2022-08-31 PROCEDURE — 93970 EXTREMITY STUDY: CPT | Mod: 26 | Performed by: INTERNAL MEDICINE

## 2022-08-31 PROCEDURE — 93970 EXTREMITY STUDY: CPT

## 2022-11-08 ENCOUNTER — APPOINTMENT (OUTPATIENT)
Dept: VASCULAR SURGERY | Facility: MEDICAL CENTER | Age: 67
End: 2022-11-08
Payer: MEDICARE

## 2022-11-09 ENCOUNTER — PATIENT MESSAGE (OUTPATIENT)
Dept: HEALTH INFORMATION MANAGEMENT | Facility: OTHER | Age: 67
End: 2022-11-09

## 2022-12-20 ENCOUNTER — OFFICE VISIT (OUTPATIENT)
Dept: VASCULAR SURGERY | Facility: MEDICAL CENTER | Age: 67
End: 2022-12-20
Payer: MEDICARE

## 2022-12-20 VITALS
WEIGHT: 119 LBS | OXYGEN SATURATION: 96 % | SYSTOLIC BLOOD PRESSURE: 98 MMHG | DIASTOLIC BLOOD PRESSURE: 54 MMHG | BODY MASS INDEX: 21.09 KG/M2 | HEIGHT: 63 IN | HEART RATE: 67 BPM | TEMPERATURE: 98.8 F

## 2022-12-20 DIAGNOSIS — I87.1 MAY-THURNER SYNDROME: ICD-10-CM

## 2022-12-20 PROCEDURE — 99213 OFFICE O/P EST LOW 20 MIN: CPT | Performed by: SURGERY

## 2022-12-20 NOTE — PROGRESS NOTES
Vascular Surgery        Established Patient Evaluation    Patient:Irma Vicente  MRN:[id number]  Primary care physician:Andrea Floyd D.O.    Vascular Consultant: Danny Esparza MD    12/20/2022  _____________________________________________________    Chief Complaint/Reason for Visit:     Chief Complaint   Patient presents with    Follow-Up     Scan review          History of Present Illness:   Irma Vicente is a 67 y.o. year old female who 8 years ago underwent catheter directed thrombolysis and a left iliac stent placement for May Thurner syndrome.  The patient's been in surveillance and done well since that time.  The patient does wear a compression stocking on that leg for comfort although she has fairly minimal residual swelling.  The patient has been a venous surveillance study on that left stent which demonstrates that everything looks widely patent.  Patient has no new complaints.    Past Medical History:     Past Medical History:   Diagnosis Date    Anxiety     Arthritis     Chest tightness     Constipation     DVT (deep venous thrombosis) (HCC)     May-Thurner syndrome     Osteoporosis      Past Surgical History:   No past surgical history on file.  Allergies:   No Known Allergies  Medications:     Outpatient Encounter Medications as of 12/20/2022   Medication Sig Dispense Refill    Omega-3 Fatty Acids (OMEGA 3 PO) Take  by mouth 2 Times a Day.      Amino Acids (AMINO ACID PO) Take  by mouth every day.      Ascorbic Acid (VITAMIN C PO) Take  by mouth every day.      Multiple Vitamins-Minerals (ZINC PO) Take  by mouth every day.      busPIRone (BUSPAR) 5 MG tablet Take 7.5 mg by mouth 3 times a day.      cholecalciferol (D-3-5) 5000 UNIT Cap Take 5,000 Units by mouth every day.      aspirin EC (ECOTRIN) 81 MG Tablet Delayed Response Take 81 mg by mouth every day.       No facility-administered encounter medications on file as of 12/20/2022.     Social History:     Social History      Socioeconomic History    Marital status:      Spouse name: Not on file    Number of children: Not on file    Years of education: Not on file    Highest education level: Not on file   Occupational History    Not on file   Tobacco Use    Smoking status: Never    Smokeless tobacco: Never   Vaping Use    Vaping Use: Never used   Substance and Sexual Activity    Alcohol use: Not Currently    Drug use: Not Currently    Sexual activity: Not on file   Other Topics Concern    Not on file   Social History Narrative    Not on file     Social Determinants of Health     Financial Resource Strain: Not on file   Food Insecurity: Not on file   Transportation Needs: Not on file   Physical Activity: Not on file   Stress: Not on file   Social Connections: Not on file   Intimate Partner Violence: Not on file   Housing Stability: Not on file      Social History     Tobacco Use   Smoking Status Never   Smokeless Tobacco Never     Social History     Substance and Sexual Activity   Alcohol Use Not Currently     Social History     Substance and Sexual Activity   Drug Use Not Currently      Family History:   No family history on file.    Review of Systems:   Constitutional: Negative for fever or chills  HENT:   Negative for hearing loss or tinnitus    Eyes:    Negative for blurred vision or loss of vision  Respiratory:  Negative for cough or hemoptysis  Cardiac:  Negative for chest pain or palpitations  Vascular:  Negative for claudication, Negative for rest pain  Gastrointestinal: Negative for vomiting or abdominal pain     Negative for hematochezia or melena   Genitourinary: Negative for dysuria or hematuria   Musculoskeletal: Negative for myalgias or acute joint pain  Skin:   Negative for itching or rash  Neurological:  Negative for dizziness or headaches     Negative for speech disturbance     Negative for extremity weakness or paresthesias  Endo/Heme:  Negative for easy bruising or bleeding         Exam:   BP (!) 98/54 (BP  "Location: Left arm, Patient Position: Sitting, BP Cuff Size: Adult)   Pulse 67   Temp 37.1 °C (98.8 °F) (Temporal)   Ht 1.6 m (5' 3\")   Wt 54 kg (119 lb)   SpO2 96%   BMI 21.08 kg/m²     Constitutional: Alert, oriented, no acute distress  HEENT:  Normocephalic and atraumatic, EOMI  Neck:   Supple, no JVD,   Cardiovascular: Regular rate and rhythm,   Pulmonary:  Good air entry bilaterally,    Abdominal:  Soft, non-tender, non-distended         Musculoskeletal: No tenderness, no deformity  Neurological:  CN II-XII grossly intact, no focal deficits  Skin:   Skin is warm and dry. No rash noted.  Vascular exam:            Imaging:   PROCEDURES:   Bilateral lower extremity venous duplex imaging.    The following venous structures were evaluated: common femoral, deep    femoral, proximal great saphenous, femoral, popliteal, peroneal, and    posterior tibial veins.    Serial compression, color, and spectral Doppler flow evaluations were    performed.       FINDINGS:   BILATERAL LOWER EXTREMITIES:   No deep venous thrombosis.    All veins demonstrate complete color filling and compressibility with    normal venous flow dynamics including spontaneous flow and respiratory    phasicity.       Assessment and Plan:   -History of catheter directed thrombolysis left lower extremity and stenting of left iliac vein for May Thurner  Patient clinically continues to do well  We will continue her surveillance program and follow-up in 1 year.              _____________________________________________________  Danny Esparza MD  [facility]  [phone]  [address], [place] [address]      "

## 2023-02-20 ENCOUNTER — HOSPITAL ENCOUNTER (OUTPATIENT)
Dept: RADIOLOGY | Facility: MEDICAL CENTER | Age: 68
End: 2023-02-20
Attending: INTERNAL MEDICINE
Payer: MEDICARE

## 2023-03-06 ENCOUNTER — HOSPITAL ENCOUNTER (OUTPATIENT)
Dept: RADIOLOGY | Facility: MEDICAL CENTER | Age: 68
End: 2023-03-06
Attending: INTERNAL MEDICINE
Payer: MEDICARE

## 2023-03-06 DIAGNOSIS — I82.402 ACUTE EMBOLISM AND THOMBOS UNSP DEEP VEINS OF L LOW EXTREM (HCC): ICD-10-CM

## 2023-03-06 DIAGNOSIS — D70.8 OTHER NEUTROPENIA (HCC): ICD-10-CM

## 2023-03-06 PROCEDURE — A9503 TC99M MEDRONATE: HCPCS

## 2023-05-05 ENCOUNTER — TELEPHONE (OUTPATIENT)
Dept: VASCULAR SURGERY | Facility: MEDICAL CENTER | Age: 68
End: 2023-05-05

## 2023-05-05 NOTE — TELEPHONE ENCOUNTER
Patient called the office today in regards to some pain and discomfort that she is having in her legs. Patient is due to have plastic surgery on 05/10/2023 and she would like to get in STAT to get that imaging done. Message was sent to Yamileth RUIZ to see how we should proceed with this. Awaiting response.   Patient scheduled to see Dr. Esparza.

## 2023-06-29 ENCOUNTER — APPOINTMENT (OUTPATIENT)
Dept: VASCULAR SURGERY | Facility: MEDICAL CENTER | Age: 68
End: 2023-06-29
Payer: MEDICARE

## 2023-10-13 ENCOUNTER — HOSPITAL ENCOUNTER (OUTPATIENT)
Dept: RADIOLOGY | Facility: MEDICAL CENTER | Age: 68
End: 2023-10-13
Attending: PHYSICAL MEDICINE & REHABILITATION
Payer: MEDICARE

## 2023-10-13 DIAGNOSIS — M25.511 RIGHT SHOULDER PAIN, UNSPECIFIED CHRONICITY: ICD-10-CM

## 2023-10-13 PROCEDURE — 73221 MRI JOINT UPR EXTREM W/O DYE: CPT | Mod: RT

## 2023-11-06 ENCOUNTER — HOSPITAL ENCOUNTER (OUTPATIENT)
Dept: RADIOLOGY | Facility: MEDICAL CENTER | Age: 68
End: 2023-11-06
Attending: SURGERY
Payer: MEDICARE

## 2023-11-06 DIAGNOSIS — I87.1 MAY-THURNER SYNDROME: ICD-10-CM

## 2023-11-06 PROCEDURE — 93978 VASCULAR STUDY: CPT | Mod: 26 | Performed by: INTERNAL MEDICINE

## 2023-11-06 PROCEDURE — 93978 VASCULAR STUDY: CPT

## 2023-11-14 ENCOUNTER — OFFICE VISIT (OUTPATIENT)
Dept: VASCULAR SURGERY | Facility: MEDICAL CENTER | Age: 68
End: 2023-11-14
Payer: MEDICARE

## 2023-11-14 VITALS
OXYGEN SATURATION: 98 % | HEIGHT: 63 IN | HEART RATE: 84 BPM | SYSTOLIC BLOOD PRESSURE: 116 MMHG | TEMPERATURE: 98.4 F | DIASTOLIC BLOOD PRESSURE: 74 MMHG | BODY MASS INDEX: 20.73 KG/M2 | WEIGHT: 117 LBS

## 2023-11-14 DIAGNOSIS — I87.1 MAY-THURNER SYNDROME: ICD-10-CM

## 2023-11-14 PROCEDURE — 99213 OFFICE O/P EST LOW 20 MIN: CPT | Performed by: SURGERY

## 2023-11-14 PROCEDURE — 3074F SYST BP LT 130 MM HG: CPT | Performed by: SURGERY

## 2023-11-14 PROCEDURE — 3078F DIAST BP <80 MM HG: CPT | Performed by: SURGERY

## 2023-11-14 NOTE — PROGRESS NOTES
"      Vascular Surgery  Established Patient Evaluation    Patient:Irma Vicente  MRN:[id number]  Primary care physician:Andrea Floyd D.O.    Vascular Consultant: Danny Esparza MD    11/14/2023  _____________________________________________________    Chief Complaint/Reason for Visit:     Chief Complaint   Patient presents with    Follow-Up     Est, Scan review         History of Present Illness:   Irma Vicente is a 68 y.o. year old female who approximately 9 years ago underwent catheter directed thrombolysis and iliac stent placement for May Thurner syndrome.  The patient's been off anticoagulation for some time.  The patient's recent surveillance duplex demonstrates a normal venous flow pattern within the left venous system.  Patient doing well from all respects.  The patient is scheduled to have shoulder surgery and has presented a paper for \"clearance\" from a DVT standpoint.  Past Medical History:     Past Medical History:   Diagnosis Date    Anxiety     Arthritis     Chest tightness     Constipation     DVT (deep venous thrombosis) (HCC)     May-Thurner syndrome     Osteoporosis      Past Surgical History:   No past surgical history on file.  Allergies:   No Known Allergies  Medications:     Outpatient Encounter Medications as of 11/14/2023   Medication Sig Dispense Refill    Omega-3 Fatty Acids (OMEGA 3 PO) Take  by mouth 2 Times a Day.      Amino Acids (AMINO ACID PO) Take  by mouth every day.      Ascorbic Acid (VITAMIN C PO) Take  by mouth every day.      Multiple Vitamins-Minerals (ZINC PO) Take  by mouth every day.      busPIRone (BUSPAR) 5 MG tablet Take 7.5 mg by mouth 3 times a day.      cholecalciferol (D-3-5) 5000 UNIT Cap Take 5,000 Units by mouth every day.      aspirin EC (ECOTRIN) 81 MG Tablet Delayed Response Take 81 mg by mouth every day.       No facility-administered encounter medications on file as of 11/14/2023.     Social History:     Social History     Socioeconomic History    Marital " status:      Spouse name: Not on file    Number of children: Not on file    Years of education: Not on file    Highest education level: Not on file   Occupational History    Not on file   Tobacco Use    Smoking status: Never    Smokeless tobacco: Never   Vaping Use    Vaping Use: Never used   Substance and Sexual Activity    Alcohol use: Not Currently    Drug use: Not Currently    Sexual activity: Not on file   Other Topics Concern    Not on file   Social History Narrative    Not on file     Social Determinants of Health     Financial Resource Strain: Not on file   Food Insecurity: Not on file   Transportation Needs: Not on file   Physical Activity: Not on file   Stress: Not on file   Social Connections: Not on file   Intimate Partner Violence: Not on file   Housing Stability: Not on file      Social History     Tobacco Use   Smoking Status Never   Smokeless Tobacco Never     Social History     Substance and Sexual Activity   Alcohol Use Not Currently     Social History     Substance and Sexual Activity   Drug Use Not Currently      Family History:   No family history on file.    Review of Systems:   Constitutional: Negative for fever or chills  HENT:   Negative for hearing loss or tinnitus    Eyes:    Negative for blurred vision or loss of vision  Respiratory:  Negative for cough or hemoptysis  Cardiac:  Negative for chest pain or palpitations  Vascular:  Negative for claudication, Negative for rest pain  Gastrointestinal: Negative for vomiting or abdominal pain     Negative for hematochezia or melena   Genitourinary: Negative for dysuria or hematuria   Musculoskeletal: Negative for myalgias or acute joint pain  Skin:   Negative for itching or rash  Neurological:  Negative for dizziness or headaches     Negative for speech disturbance     Negative for extremity weakness or paresthesias  Endo/Heme:  Negative for easy bruising or bleeding         Exam:   /74 (BP Location: Left arm, Patient Position:  "Sitting, BP Cuff Size: Adult)   Pulse 84   Temp 36.9 °C (98.4 °F) (Temporal)   Ht 1.6 m (5' 3\")   Wt 53.1 kg (117 lb)   SpO2 98%   BMI 20.73 kg/m²     Constitutional: Alert, oriented, no acute distress  HEENT:  Normocephalic and atraumatic, EOMI  Neck:   Supple, no JVD,   Cardiovascular: Regular rate and rhythm,   Pulmonary:  Good air entry bilaterally,    Abdominal:  Soft, non-tender, non-distended         Musculoskeletal: No tenderness, no deformity  Neurological:  CN II-XII grossly intact, no focal deficits  Skin:   Skin is warm and dry. No rash noted.  Vascular exam: In tact              Imaging:         Assessment and Plan:     Duplex 11/6/2023     The inferior vena cava appears patent with normal Doppler flow. The    inferior vena cava appears patent with normal Doppler flow.       Bilateral common and external iliac veins appear patent with normal Doppler    flow.    The left common iliac vein stent is patent.   No evidence for DVT.    With respect to the patient's iliac vein stent it appears to be widely patent.  Patient was instructed that we will continue her surveillance study and relook at the stent again in 1 year.  I do not provide any type of clearance for surgery based on venous thromboembolic risk.  I discussed with the patient what her risk was and that the surgeon should use appropriate DVT prophylaxis based on their guidelines.  The patient does not warrant temporary IVC filter placement.  Would recommend chemical and mechanical prophylaxis.    _____________________________________________________  Danny Esparza MD  [facility]  [phone]  [address], [place] [address]      "

## 2024-02-15 ENCOUNTER — APPOINTMENT (OUTPATIENT)
Dept: ADMISSIONS | Facility: MEDICAL CENTER | Age: 69
End: 2024-02-15
Attending: ORTHOPAEDIC SURGERY
Payer: MEDICARE

## 2024-03-04 ENCOUNTER — PRE-ADMISSION TESTING (OUTPATIENT)
Dept: ADMISSIONS | Facility: MEDICAL CENTER | Age: 69
End: 2024-03-04
Attending: ORTHOPAEDIC SURGERY
Payer: MEDICARE

## 2024-03-04 VITALS — BODY MASS INDEX: 20.73 KG/M2 | HEIGHT: 63 IN

## 2024-03-04 NOTE — PREPROCEDURE INSTRUCTIONS
"Preadmit appointment: \" Preparing for your Procedure information\" sheet given to patient with verbal and written instructions. Patient instructed to continue prescribed medications through the day before surgery, instructed to take the following medications the day of surgery per anesthesia protocol: buspar.       Pt states, \"no issues with anesthesia\".  Fasting guidelines per MD's instructions for type of diet and when to initiate NPO status.      All Pt's questions and concerns answered or addressed.  "

## 2024-03-07 ENCOUNTER — APPOINTMENT (OUTPATIENT)
Dept: ADMISSIONS | Facility: MEDICAL CENTER | Age: 69
End: 2024-03-07
Attending: ORTHOPAEDIC SURGERY
Payer: MEDICARE

## 2024-03-07 DIAGNOSIS — Z01.812 PRE-OPERATIVE LABORATORY EXAMINATION: ICD-10-CM

## 2024-03-07 LAB
ERYTHROCYTE [DISTWIDTH] IN BLOOD BY AUTOMATED COUNT: 44.4 FL (ref 35.9–50)
HCT VFR BLD AUTO: 40.3 % (ref 37–47)
HGB BLD-MCNC: 13.9 G/DL (ref 12–16)
MCH RBC QN AUTO: 32.6 PG (ref 27–33)
MCHC RBC AUTO-ENTMCNC: 34.5 G/DL (ref 32.2–35.5)
MCV RBC AUTO: 94.4 FL (ref 81.4–97.8)
PLATELET # BLD AUTO: 252 K/UL (ref 164–446)
PMV BLD AUTO: 9.7 FL (ref 9–12.9)
RBC # BLD AUTO: 4.27 M/UL (ref 4.2–5.4)
WBC # BLD AUTO: 5.5 K/UL (ref 4.8–10.8)

## 2024-03-07 PROCEDURE — 36415 COLL VENOUS BLD VENIPUNCTURE: CPT

## 2024-03-07 PROCEDURE — 85027 COMPLETE CBC AUTOMATED: CPT

## 2024-03-29 ENCOUNTER — HOSPITAL ENCOUNTER (OUTPATIENT)
Facility: MEDICAL CENTER | Age: 69
End: 2024-03-29
Attending: ORTHOPAEDIC SURGERY | Admitting: ORTHOPAEDIC SURGERY
Payer: MEDICARE

## 2024-03-29 ENCOUNTER — ANESTHESIA EVENT (OUTPATIENT)
Dept: SURGERY | Facility: MEDICAL CENTER | Age: 69
End: 2024-03-29
Payer: MEDICARE

## 2024-03-29 ENCOUNTER — ANESTHESIA (OUTPATIENT)
Dept: SURGERY | Facility: MEDICAL CENTER | Age: 69
End: 2024-03-29
Payer: MEDICARE

## 2024-03-29 VITALS
TEMPERATURE: 97.5 F | BODY MASS INDEX: 21 KG/M2 | HEART RATE: 53 BPM | OXYGEN SATURATION: 97 % | DIASTOLIC BLOOD PRESSURE: 83 MMHG | WEIGHT: 118.5 LBS | RESPIRATION RATE: 16 BRPM | HEIGHT: 63 IN | SYSTOLIC BLOOD PRESSURE: 146 MMHG

## 2024-03-29 DIAGNOSIS — Z01.812 PRE-OPERATIVE LABORATORY EXAMINATION: ICD-10-CM

## 2024-03-29 PROCEDURE — 160046 HCHG PACU - 1ST 60 MINS PHASE II: Performed by: ORTHOPAEDIC SURGERY

## 2024-03-29 PROCEDURE — 160009 HCHG ANES TIME/MIN: Performed by: ORTHOPAEDIC SURGERY

## 2024-03-29 PROCEDURE — 160035 HCHG PACU - 1ST 60 MINS PHASE I: Performed by: ORTHOPAEDIC SURGERY

## 2024-03-29 PROCEDURE — 700101 HCHG RX REV CODE 250: Performed by: ANESTHESIOLOGY

## 2024-03-29 PROCEDURE — 700105 HCHG RX REV CODE 258: Performed by: ORTHOPAEDIC SURGERY

## 2024-03-29 PROCEDURE — 160002 HCHG RECOVERY MINUTES (STAT): Performed by: ORTHOPAEDIC SURGERY

## 2024-03-29 PROCEDURE — 160025 RECOVERY II MINUTES (STATS): Performed by: ORTHOPAEDIC SURGERY

## 2024-03-29 PROCEDURE — 700111 HCHG RX REV CODE 636 W/ 250 OVERRIDE (IP): Mod: JZ | Performed by: ORTHOPAEDIC SURGERY

## 2024-03-29 PROCEDURE — A9270 NON-COVERED ITEM OR SERVICE: HCPCS | Performed by: ANESTHESIOLOGY

## 2024-03-29 PROCEDURE — 160048 HCHG OR STATISTICAL LEVEL 1-5: Performed by: ORTHOPAEDIC SURGERY

## 2024-03-29 PROCEDURE — C1713 ANCHOR/SCREW BN/BN,TIS/BN: HCPCS | Performed by: ORTHOPAEDIC SURGERY

## 2024-03-29 PROCEDURE — 64415 NJX AA&/STRD BRCH PLXS IMG: CPT | Mod: XU | Performed by: ORTHOPAEDIC SURGERY

## 2024-03-29 PROCEDURE — 700102 HCHG RX REV CODE 250 W/ 637 OVERRIDE(OP): Performed by: ANESTHESIOLOGY

## 2024-03-29 PROCEDURE — 160029 HCHG SURGERY MINUTES - 1ST 30 MINS LEVEL 4: Performed by: ORTHOPAEDIC SURGERY

## 2024-03-29 PROCEDURE — 160041 HCHG SURGERY MINUTES - EA ADDL 1 MIN LEVEL 4: Performed by: ORTHOPAEDIC SURGERY

## 2024-03-29 PROCEDURE — 700111 HCHG RX REV CODE 636 W/ 250 OVERRIDE (IP): Performed by: ANESTHESIOLOGY

## 2024-03-29 DEVICE — ANCHOR SUTURE OMEGA PEEK OD6.5 MM KNOTLESS STERILE LATEX FREE: Type: IMPLANTABLE DEVICE | Status: FUNCTIONAL

## 2024-03-29 DEVICE — ANCHOR SUTURE OMEGA PEEK OD4.75 MM 1 ARM KNOTLESS STERILE LATEX FREE DISPOSABLE: Type: IMPLANTABLE DEVICE | Status: FUNCTIONAL

## 2024-03-29 RX ORDER — ONDANSETRON 2 MG/ML
INJECTION INTRAMUSCULAR; INTRAVENOUS PRN
Status: DISCONTINUED | OUTPATIENT
Start: 2024-03-29 | End: 2024-03-29 | Stop reason: SURG

## 2024-03-29 RX ORDER — SODIUM CHLORIDE, SODIUM LACTATE, POTASSIUM CHLORIDE, CALCIUM CHLORIDE 600; 310; 30; 20 MG/100ML; MG/100ML; MG/100ML; MG/100ML
INJECTION, SOLUTION INTRAVENOUS CONTINUOUS
Status: DISCONTINUED | OUTPATIENT
Start: 2024-03-29 | End: 2024-03-29 | Stop reason: HOSPADM

## 2024-03-29 RX ORDER — HYDROMORPHONE HYDROCHLORIDE 1 MG/ML
0.2 INJECTION, SOLUTION INTRAMUSCULAR; INTRAVENOUS; SUBCUTANEOUS
Status: DISCONTINUED | OUTPATIENT
Start: 2024-03-29 | End: 2024-03-29 | Stop reason: HOSPADM

## 2024-03-29 RX ORDER — MIDAZOLAM HYDROCHLORIDE 1 MG/ML
INJECTION INTRAMUSCULAR; INTRAVENOUS PRN
Status: DISCONTINUED | OUTPATIENT
Start: 2024-03-29 | End: 2024-03-29 | Stop reason: SURG

## 2024-03-29 RX ORDER — HYDROMORPHONE HYDROCHLORIDE 1 MG/ML
0.4 INJECTION, SOLUTION INTRAMUSCULAR; INTRAVENOUS; SUBCUTANEOUS
Status: DISCONTINUED | OUTPATIENT
Start: 2024-03-29 | End: 2024-03-29 | Stop reason: HOSPADM

## 2024-03-29 RX ORDER — ACETAMINOPHEN 500 MG
1000 TABLET ORAL ONCE
Status: COMPLETED | OUTPATIENT
Start: 2024-03-29 | End: 2024-03-29

## 2024-03-29 RX ORDER — BUPIVACAINE HYDROCHLORIDE 2.5 MG/ML
INJECTION, SOLUTION EPIDURAL; INFILTRATION; INTRACAUDAL PRN
Status: DISCONTINUED | OUTPATIENT
Start: 2024-03-29 | End: 2024-03-29 | Stop reason: SURG

## 2024-03-29 RX ORDER — OXYCODONE HCL 5 MG/5 ML
10 SOLUTION, ORAL ORAL
Status: DISCONTINUED | OUTPATIENT
Start: 2024-03-29 | End: 2024-03-29 | Stop reason: HOSPADM

## 2024-03-29 RX ORDER — HYDROMORPHONE HYDROCHLORIDE 1 MG/ML
0.5 INJECTION, SOLUTION INTRAMUSCULAR; INTRAVENOUS; SUBCUTANEOUS
Status: DISCONTINUED | OUTPATIENT
Start: 2024-03-29 | End: 2024-03-29 | Stop reason: HOSPADM

## 2024-03-29 RX ORDER — CELECOXIB 200 MG/1
200 CAPSULE ORAL ONCE
Status: COMPLETED | OUTPATIENT
Start: 2024-03-29 | End: 2024-03-29

## 2024-03-29 RX ORDER — DEXAMETHASONE SODIUM PHOSPHATE 4 MG/ML
INJECTION, SOLUTION INTRA-ARTICULAR; INTRALESIONAL; INTRAMUSCULAR; INTRAVENOUS; SOFT TISSUE PRN
Status: DISCONTINUED | OUTPATIENT
Start: 2024-03-29 | End: 2024-03-29 | Stop reason: SURG

## 2024-03-29 RX ORDER — HALOPERIDOL 5 MG/ML
1 INJECTION INTRAMUSCULAR
Status: DISCONTINUED | OUTPATIENT
Start: 2024-03-29 | End: 2024-03-29 | Stop reason: HOSPADM

## 2024-03-29 RX ORDER — CEFAZOLIN SODIUM 1 G/3ML
INJECTION, POWDER, FOR SOLUTION INTRAMUSCULAR; INTRAVENOUS PRN
Status: DISCONTINUED | OUTPATIENT
Start: 2024-03-29 | End: 2024-03-29 | Stop reason: SURG

## 2024-03-29 RX ORDER — EPHEDRINE SULFATE 50 MG/ML
INJECTION, SOLUTION INTRAVENOUS PRN
Status: DISCONTINUED | OUTPATIENT
Start: 2024-03-29 | End: 2024-03-29 | Stop reason: SURG

## 2024-03-29 RX ORDER — ONDANSETRON 2 MG/ML
4 INJECTION INTRAMUSCULAR; INTRAVENOUS
Status: DISCONTINUED | OUTPATIENT
Start: 2024-03-29 | End: 2024-03-29 | Stop reason: HOSPADM

## 2024-03-29 RX ORDER — HYDRALAZINE HYDROCHLORIDE 20 MG/ML
5 INJECTION INTRAMUSCULAR; INTRAVENOUS
Status: DISCONTINUED | OUTPATIENT
Start: 2024-03-29 | End: 2024-03-29 | Stop reason: HOSPADM

## 2024-03-29 RX ORDER — EPINEPHRINE 1 MG/ML(1)
AMPUL (ML) INJECTION
Status: DISCONTINUED | OUTPATIENT
Start: 2024-03-29 | End: 2024-03-29 | Stop reason: HOSPADM

## 2024-03-29 RX ORDER — LIDOCAINE HYDROCHLORIDE 20 MG/ML
INJECTION, SOLUTION EPIDURAL; INFILTRATION; INTRACAUDAL; PERINEURAL PRN
Status: DISCONTINUED | OUTPATIENT
Start: 2024-03-29 | End: 2024-03-29 | Stop reason: SURG

## 2024-03-29 RX ORDER — OXYCODONE HCL 5 MG/5 ML
5 SOLUTION, ORAL ORAL
Status: DISCONTINUED | OUTPATIENT
Start: 2024-03-29 | End: 2024-03-29 | Stop reason: HOSPADM

## 2024-03-29 RX ADMIN — PROPOFOL 150 MG: 10 INJECTION, EMULSION INTRAVENOUS at 10:20

## 2024-03-29 RX ADMIN — ONDANSETRON 4 MG: 2 INJECTION INTRAMUSCULAR; INTRAVENOUS at 10:55

## 2024-03-29 RX ADMIN — CEFAZOLIN 2 G: 1 INJECTION, POWDER, FOR SOLUTION INTRAMUSCULAR; INTRAVENOUS at 10:17

## 2024-03-29 RX ADMIN — SODIUM CHLORIDE, POTASSIUM CHLORIDE, SODIUM LACTATE AND CALCIUM CHLORIDE: 600; 310; 30; 20 INJECTION, SOLUTION INTRAVENOUS at 10:17

## 2024-03-29 RX ADMIN — BUPIVACAINE HYDROCHLORIDE 30 ML: 2.5 INJECTION, SOLUTION EPIDURAL; INFILTRATION; INTRACAUDAL at 10:11

## 2024-03-29 RX ADMIN — CELECOXIB 200 MG: 200 CAPSULE ORAL at 10:00

## 2024-03-29 RX ADMIN — ACETAMINOPHEN 1000 MG: 500 TABLET ORAL at 10:00

## 2024-03-29 RX ADMIN — PROPOFOL 200 MCG/KG/MIN: 10 INJECTION, EMULSION INTRAVENOUS at 10:21

## 2024-03-29 RX ADMIN — DEXAMETHASONE SODIUM PHOSPHATE 8 MG: 4 INJECTION INTRA-ARTICULAR; INTRALESIONAL; INTRAMUSCULAR; INTRAVENOUS; SOFT TISSUE at 10:23

## 2024-03-29 RX ADMIN — LIDOCAINE HYDROCHLORIDE 60 MG: 20 INJECTION, SOLUTION EPIDURAL; INFILTRATION; INTRACAUDAL at 10:20

## 2024-03-29 RX ADMIN — DEXAMETHASONE SODIUM PHOSPHATE 2 MG: 4 INJECTION INTRA-ARTICULAR; INTRALESIONAL; INTRAMUSCULAR; INTRAVENOUS; SOFT TISSUE at 10:11

## 2024-03-29 RX ADMIN — EPHEDRINE SULFATE 10 MG: 50 INJECTION, SOLUTION INTRAVENOUS at 10:51

## 2024-03-29 RX ADMIN — MIDAZOLAM HYDROCHLORIDE 2 MG: 1 INJECTION, SOLUTION INTRAMUSCULAR; INTRAVENOUS at 10:09

## 2024-03-29 RX ADMIN — FENTANYL CITRATE 100 MCG: 50 INJECTION, SOLUTION INTRAMUSCULAR; INTRAVENOUS at 10:20

## 2024-03-29 ASSESSMENT — PAIN DESCRIPTION - PAIN TYPE
TYPE: SURGICAL PAIN
TYPE: SURGICAL PAIN
TYPE: CHRONIC PAIN

## 2024-03-29 ASSESSMENT — PAIN SCALES - GENERAL: PAIN_LEVEL: 0

## 2024-03-29 NOTE — ANESTHESIA POSTPROCEDURE EVALUATION
Patient: Irma Vicente    Procedure Summary       Date: 03/29/24 Room / Location:  OR 03 / SURGERY [facility]    Anesthesia Start: 1017 Anesthesia Stop: 1108    Procedure: RIGHT SHOULDER ARTHROSCOPY, SUBACROMIAL DECOMPRESSION, DEBRIDEMENT, ROTATOR CUFF REPAIR, CHONDROPLASTY (Right: Shoulder) Diagnosis: (m75.121)    Surgeons: Socorro Mao M.D. Responsible Provider: Adrian Rey M.D.    Anesthesia Type: general, peripheral nerve block ASA Status: 2            Final Anesthesia Type: general, peripheral nerve block  Last vitals  BP   Blood Pressure : 129/67    Temp   36.2 °C (97.2 °F)    Pulse   60   Resp   12    SpO2   100 %      Anesthesia Post Evaluation    Patient location during evaluation: PACU  Patient participation: complete - patient participated  Level of consciousness: awake and alert  Pain score: 0    Airway patency: patent  Anesthetic complications: no  Cardiovascular status: hemodynamically stable  Respiratory status: acceptable  Hydration status: euvolemic    PONV: none          No notable events documented.     Nurse Pain Score: 0 (NPRS)

## 2024-03-29 NOTE — ANESTHESIA TIME REPORT
Anesthesia Start and Stop Event Times       Date Time Event    3/29/2024 0922 Ready for Procedure     1017 Anesthesia Start     1108 Anesthesia Stop          Responsible Staff  03/29/24      Name Role Begin End    Adrian Rey M.D. Anesth 1017 1108          Overtime Reason:  no overtime (within assigned shift)    Comments:

## 2024-03-29 NOTE — DISCHARGE INSTRUCTIONS
Socorro Mao MD Office telephone:  [phone]   Shoulder Arthroscopy Post-Operative Instructions     Nerve Block:  The effects of the nerve block may last from 12-36 hours depending on the medication used.   It is recommended to sleep in a semi upright position for the first few days as the nerve block may cause shortness of breath when lying flat.         Dressings/shower: Keep your shoulder dressing clean and dry after surgery.  You may remove your post-operative dressing three days following surgery.  You may shower then allowing soap and water to run over incisions.  Carefully dry the surgical area with a clean cloth.  Then, cover your incisions with band-aids or a light dressing which can be changed as needed until sutures are removed at your first post-op appointment.     Baths/Pools/Hot Tubs:  Please do not submerge your incision in a hot tub, pool, or bath until at least six weeks after surgery.  Make sure that your incision is healed with no remaining scab or drainage before submerging in water.     Compression Hose/AMINA hose: Compression hose/AMINA hose will be placed on your legs prior to surgery at the hospital.  Please keep these on for at least two days, as they help control leg swelling as well as reduce the risk of a blood clot.   You may remove the compression hose temporarily to shower.     Ice: Use ice or cold therapy to your shoulder as you feel necessary to help with the pain and swelling.  Typically, this is 20 minutes on and 20 minutes off for the first several days following surgery.  Cold therapy units can be used as directed.         Sling:  Please wear your sling when walking about and when sleeping.  You may carefully remove the sling when awake and seated.  Please support your forearm on a pillow when the sling is removed. You may remove the pillow portion of the sling after two weeks. If you have questions regarding how to wear sling or need a replacement, please contact [facility] @ [phone].     Activity:  You may remove your sling when awake and seated.  Please support your forearm on a pillow.  Once your sling is removed, you may move your elbow, wrist and hand as tolerated. Please do not lift anything heavier than a cup of coffee on your operative side.  Codman/pendulum exercises are encouraged 3-5 times a day. You will need to remove the sling to perform these exercises.     Pain Medication:  Take your pain medication as prescribed, only as needed.  Do not drive or operate machinery while taking narcotic pain medication.  You may resume anti-inflammatory medications any time after surgery. Please take medication with food to avoid stomach upset.     Aspirin:  Please take Aspirin 81 mg twice daily starting the morning after surgery and continue for 2 weeks, to help prevent a blood clot.     Driving:  Please arrange a ride to and from the hospital/surgery center on the day of surgery.   You may drive as soon as you are off pain medication and feel comfortable behind the wheel.     Physical Therapy:  Contact a physical therapy facility approved by your insurance plan to schedule your initial visits.  Typically, physical therapy is scheduled twice weekly to begin at approximately two weeks after surgery unless otherwise noted.     Follow-up:  Your first post-op appointment should be scheduled 10-14 days after surgery.  The details of your procedure and specifics of rehabilitation will be discussed.     Problems/concerns: If you have a problem or significant concern (unexpected pain, excessive bleeding or discharge from your incisions, fever or chills) or do not hesitate to call the office @ [phone] or go to your local emergency room.     Socorro Mao MD [facility] Shoulder [facility] [address], [address] [place], [place] [address] Office telephone:  [phone] Office fax: [phone]       What to Expect Post Anesthesia    Rest and take it easy for the first 24  hours.  A responsible adult is recommended to remain with you during that time.  It is normal to feel sleepy.  We encourage you to not do anything that requires balance, judgment or coordination.    FOR 24 HOURS DO NOT:  Drive, operate machinery or run household appliances.  Drink beer or alcoholic beverages.  Make important decisions or sign legal documents.    To avoid nausea, slowly advance diet as tolerated, avoiding spicy or greasy foods for the first day.  Add more substantial food to your diet according to your provider's instructions.  INCREASE FLUIDS AND FIBER TO AVOID CONSTIPATION.    MILD FLU-LIKE SYMPTOMS ARE NORMAL.  YOU MAY EXPERIENCE GENERALIZED MUSCLE ACHES, THROAT IRRITATION, HEADACHE AND/OR SOME NAUSEA.    If any questions arise, call your provider.  If your provider is not available, please feel free to call the Surgical Center at [phone].    MEDICATIONS: Resume taking daily medication.  Take prescribed pain medication with food.  If no medication is prescribed, you may take non-aspirin pain medication if needed.  PAIN MEDICATION CAN BE VERY CONSTIPATING.  Take a stool softener or laxative such as senokot, pericolace, or milk of magnesia if needed.    Last pain medication given at _________________________.          Peripheral Nerve Block Discharge Instructions from Same Day Surgery and Inpatient :    What to Expect - Upper Extremity  You may experience numbness and weakness in shoulder, arm, and hand  on the same side as your surgery  This is normal. For some people, this may be an unpleasant sensation. Be very careful with your numb limb  Ask for help when you need it  Shoulder Surgery Side Effects  In addition to numbness and weakness you may experience other symptoms  Other nerves that are close to those nerves injected can also be affected by local anesthesia  You may experience a hoarseness in your voice  Your breathing may feel different  You may also notice drooping of your eyelid,  "pupil constriction, and decreased sweating, on the side of your surgery  All of these side effects are normal and will resolve when the local anesthetic wears off   Prevent Injury  Protect the limb like a baby  Beware of exposing your limb to extreme heat or cold or trauma  The limb may be injured without you noticing because it is numb  Keep the limb elevated whenever possible  Do not sleep on the limb  Change the position of the limb regularly  Avoid putting pressure on your surgical limb  Pain Control  The initial block on the day of surgery will make your extremity feel \"numb\"  Any consecutive injection including prior to discharge from the hospital will make your extremity feel \"numb\"  You may feel an aching or burning when the local anesthesia starts to wear off  Take pain pills as prescribed by your surgeon  Call your surgeon or anesthesiologist if you do not have adequate pain control    "

## 2024-03-29 NOTE — ANESTHESIA PROCEDURE NOTES
Peripheral Block    Date/Time: 3/29/2024 10:09 AM    Performed by: Adrian Rey M.D.  Authorized by: Adrian Rey M.D.    Patient Location:  Pre-op  Start Time:  3/29/2024 10:09 AM  End Time:  3/29/2024 10:13 AM  Reason for Block: at surgeon's request and post-op pain management ONLY    patient identified, IV checked, site marked, risks and benefits discussed, surgical consent, monitors and equipment checked, pre-op evaluation and timeout performed    Patient Position:  Sitting  Prep: ChloraPrep    Monitoring:  Heart rate, continuous pulse ox and cardiac monitor  Block Region:  Upper Extremity  Upper Extremity - Block Type:  BRACHIAL PLEXUS block, Supraclavicular approach    Laterality:  Right  Procedures: ultrasound guided  Image captured, interpreted and electronically stored.  Local Infiltration:  Lidocaine  Strength:  1 %  Dose:  3 ml  Block Type:  Single-shot  Needle Length:  100mm  Needle Gauge:  21 G  Needle Localization:  Ultrasound guidance  Ultrasound picture in chart  Injection Assessment:  Negative aspiration for heme, no paresthesia on injection, incremental injection and local visualized surrounding nerve on ultrasound  Evidence of intravascular injection: No     US Guided Supraclavicular Brachial Plexus Block    US transducer placed cephalad and parallel to clavicle in angle to view the subclavian artery with the brachial plexus lateral and superficial to the artery. Needle inserted lateral to the transducer in-plane and advanced with the needle tip visualized continually into the perineural position. After negative aspiration, LA injected without resistance.

## 2024-03-29 NOTE — ANESTHESIA PREPROCEDURE EVALUATION
Case: [id number] Date/Time: 03/29/24 1045    Procedure: RIGHT SHOULDER ARTHROSCOPY, SUBACROMIAL DECOMPRESSION, DEBRIDEMENT, ROTATOR CUFF REPAIR AND REPAIRS AS INDICATED (Right)    Pre-op diagnosis: m75.121    Location:  OR  / SURGERY [facility]    Surgeons: Socorro Mao M.D.          PONV  Denies: MI/CHF/smoking/CVA/DM/CKD    Relevant Problems   No relevant active problems       Physical Exam    Airway   Mallampati: II  TM distance: >3 FB  Neck ROM: full       Cardiovascular - normal exam  Rhythm: regular  Rate: normal  (-) murmur     Dental - normal exam           Pulmonary - normal exam  Breath sounds clear to auscultation     Abdominal    Neurological - normal exam                   Anesthesia Plan    ASA 2       Plan - general and peripheral nerve block     Peripheral nerve block will be post-op pain control  Airway plan will be LMA          Induction: intravenous    Postoperative Plan: Postoperative administration of opioids is intended.    Pertinent diagnostic labs and testing reviewed    Informed Consent:    Anesthetic plan and risks discussed with patient.    Use of blood products discussed with: patient whom consented to blood products.

## 2024-03-29 NOTE — OP REPORT
DATE OF SERVICE:  03/29/2024     PREOPERATIVE DIAGNOSES:  Right shoulder impingement syndrome, degenerative   labral tear, mild-to-moderate glenohumeral joint arthritis, rotator cuff tear.     POSTOPERATIVE DIAGNOSES:  Right shoulder impingement syndrome, degenerative   labral tear, mild-to-moderate glenohumeral joint arthritis, rotator cuff tear.     PROCEDURES:  Right shoulder arthroscopy, subacromial decompression, global   labral debridement, glenohumeral joint chondroplasty, arthroscopic rotator   cuff repair (bursal-sided only).     SURGEON:  Socorro Mao MD     ASSISTANT:  Danny Saenz CFA     ANESTHESIOLOGIST:  Adrian Rey MD     TYPE OF ANESTHESIA:  General, with preoperative interscalene nerve block.     INTRAVENOUS FLUID:  1 liter crystalloid.     ESTIMATED BLOOD LOSS:  Minimal.     DRAINS:  None.     SPECIMENS:  None.     COMPLICATIONS:  None.     IMPLANTS:  Bergoo Omega anchor x1.     REASON FOR PROCEDURE:  The patient is a 68-year-old female with a longstanding   history of right shoulder pain.  She failed to definitively respond to   nonoperative measures and thus we decided to proceed with arthroscopy.  We   reviewed plain x-ray as well as MRI findings.     OPERATION:  The patient was given a right interscalene nerve block by the   anesthesiologist before surgery.  Once back in the operating room, a breathing   tube was placed.  She was given 2 grams of IV Ancef.  She was then placed in   the lateral decubitus position.  Care was taken to pad her axilla as well as   all bony prominences.  The right shoulder was then examined under anesthesia.    She was noted to have mildly limited range of motion without instability.    The right upper extremity was then prepped with ChloraPrep and draped in   standard sterile fashion.  It was then placed in the traction device.  Bony   landmarks were drawn and a standard posterior portal was established.  The   arthroscope was then inserted into the  glenohumeral joint.  An anterosuperior   cannula was placed in the rotator interval, just beneath the biceps tendon.  A   probe was inserted.  There were mild to moderate degenerative changes.  The   anterior half or so of the humeral head demonstrated diffuse grade III change.    The cartilage demonstrated slight softening, but no significant arthritic   change at the posterior half of the humeral head.  There were three loose   bodies that were removed from the joint with a [place].  These were   approximately 1 cm.  These appeared to be flakes of cartilage likely from the   humeral head.  The glenoid itself demonstrated a small area of grade IV   chondral change in the mid portion.  A smoothing chondroplasty was performed   on the glenoid as well as humeral head.  There was some thinning noted to the   superior rotator cuff.  The biceps tendon was normal.  There was a global   degenerative labral tear.  A global labral debridement took place.  The   subscapularis tendon was noted to be intact.  The arthroscope was then   inserted into the subacromial space.  A lateral portal was established.  There   was a copious amount of thickened, inflamed bursal tissue.  This was removed   with the shaver.  Borders of the acromion were defined.  A 5.5 mm resector was   used to remove the anterior curve as well as lateral edge.  Portals were   switched.  Using a standard cutting block technique from posterior to   anterior, a subacromial decompression was completed.  This created a nice,   smooth surface to the undersurface of the acromion.  Attention was then turned   to the rotator cuff below.  There was a relatively high-grade partial   thickness bursal surface tear.  The adjacent greater tuberosity was roughened.    This tear involved the supraspinatus.  The decision was made to leave the   intact articular fibers and perform a bursal surface only repair.  This was   done with a SlingShot loaded with a 2 mm tape.  The 2 mm  tape was used to   craig through the upper layer of the supraspinatus torn tendon.  An inverted   horizontal mattress suture was created with a SlingShot.  The tapes were then   placed into a peak eyelet of an Omega anchor.  The peak eyelet was impacted   and a 6.5 mm Omega screw followed.  This allowed for excellent repair.  All   instruments were then removed.  Portals were closed with 3-0 nylon suture.  A   sterile dressing was applied.  All drapes were removed and the arm was   carefully taken out of traction and placed into a shoulder abduction sling.    The patient was placed supine on a stretcher and taken to recovery room, in   stable condition.        ______________________________  MD HIRAM RODRIGUEZ/RAMON    DD:  03/29/2024 11:20  DT:  03/29/2024 11:44    Job#:  [id number]

## 2024-03-29 NOTE — OR SURGEON
Immediate Post OP Note    PreOp Diagnosis: RIGHT shoulder impingement, labral tear, mild GH joint OA, RCT      PostOp Diagnosis: SAME      Procedure(s):  RIGHT SHOULDER ARTHROSCOPY, SUBACROMIAL DECOMPRESSION, DEBRIDEMENT, ROTATOR CUFF REPAIR, CHONDROPLASTY - Wound Class: Clean    Surgeon(s):  Socorro Mao M.D.    Anesthesiologist/Type of Anesthesia:  Anesthesiologist: Adrian Rey M.D./General    Surgical Staff:  Circulator: Sharon Gregg R.N.  Scrub Person: Chad Ellis Assist: BC LaboyNSHAKILA    Specimens removed if any:  * No specimens in log *    Estimated Blood Loss: min    Findings: as above    Complications: none    [place]        3/29/2024 11:14 AM Socorro Mao M.D.

## 2024-03-29 NOTE — OR NURSING
1000   Pt allergies and NPO status verified.  Home medications reconciled.  Belongings secured.  Pt verbalizes understanding of pain scale, expected course of stay, and plan of care.  Surgical site verified with pt.  IV access established.   All questions answered.  Bed in low position.  Call light in reach.

## 2024-03-29 NOTE — OR NURSING
1106: To PACU from OR via gurney, sleeping, respirations spontaneous and non-labored via OPA. Stable on 6L mask. Ice pack applied over c/d/i right shoulder surgical dressings.    1115: OPA out. Pt remains stable on 4L mask. Denies pain and nausea.     1130: Pt denies pain and nausea. Stable on RA.    1145: Updated pt family via phone. Pt denies pain and nausea. Stable on RA.     1155: No change.Meets criteria to transfer to Stage 2

## 2024-03-29 NOTE — ANESTHESIA PROCEDURE NOTES
Airway    Date/Time: 3/29/2024 10:21 AM    Performed by: Adrian Rey M.D.  Authorized by: Adrian Rey M.D.    Location:  OR  Urgency:  Elective  Indications for Airway Management:  Anesthesia      Spontaneous Ventilation: absent    Sedation Level:  Deep  Preoxygenated: Yes    Final Airway Type:  Supraglottic airway  Final Supraglottic Airway:  Standard LMA    SGA Size:  3  Number of Attempts at Approach:  1   Atraumatic insertion, good seal

## 2024-03-29 NOTE — OR NURSING
1300: Report received from Sheeba LUGO    1321: Discharge education completed and family denies further questions.     1330: Discharged to care of family post uneventful stay in phase II recovery.

## 2024-04-16 NOTE — DOCUMENTATION QUERY
[facility]                                                                       Query Response Note      PATIENT:               CASSIE ROJAS  ACCT #:                  [id number]  MRN:                     [id number]  :                      1955  ADMIT DATE:       3/29/2024 9:02 AM  DISCH DATE:        3/29/2024 1:30 PM  RESPONDING  PROVIDER #:        [id number]           QUERY TEXT:    Please clarify in documentation if the Right rotator cuff tear, degenerative labral tear, and mild-to-moderate glenohumeral joint arthritis ARE inherent to the Shoulder Impingement Syndrome.    Any questions please contact me via email [email or web address]    The patient's clinical indicators include:  Patient with a longstanding history of right shoulder pain. Has failed to definitively respond to nonoperative measures and thus we decided to proceed with arthroscopy.  We reviewed plain x-ray as well as MRI findings.    Pre & Post-Operative Diagnoses: Right shoulder impingement syndrome, degenerative labral tear, mild-to-moderate glenohumeral joint arthritis, rotator cuff tear.    Treatment or Monitoring: PROCEDURES:  Right shoulder arthroscopy, subacromial decompression, global labral debridement, glenohumeral joint chondroplasty, arthroscopic rotator cuff repair (bursal-sided only).    Risk Factors: N/A  Options provided:   -- Right rotator cuff tear, degenerative labral tear, and mild-to-moderate glenohumeral joint arthritis ARE inherent to the Shoulder Impingement Syndrome.   -- Right rotator cuff tear, degenerative labral tear, and mild-to-moderate glenohumeral joint arthritis are NOT inherent to the Shoulder Impingement Syndrome.   -- Other (please specify in the medical record)   -- Other explanation of clinical findings   -- Unable to determine      Query created by: Judith Bedolla on 2024 6:52 AM    RESPONSE TEXT:    Unable to determine           Electronically signed by:  ALFREDO TAYLOR MD 4/16/2024 4:05 PM

## 2024-07-18 ENCOUNTER — APPOINTMENT (OUTPATIENT)
Dept: RADIOLOGY | Facility: MEDICAL CENTER | Age: 69
End: 2024-07-18
Attending: EMERGENCY MEDICINE
Payer: MEDICARE

## 2024-07-18 ENCOUNTER — HOSPITAL ENCOUNTER (EMERGENCY)
Facility: MEDICAL CENTER | Age: 69
End: 2024-07-18
Attending: EMERGENCY MEDICINE
Payer: MEDICARE

## 2024-07-18 ENCOUNTER — HOSPITAL ENCOUNTER (EMERGENCY)
Facility: MEDICAL CENTER | Age: 69
End: 2024-07-19
Payer: MEDICARE

## 2024-07-18 ENCOUNTER — OFFICE VISIT (OUTPATIENT)
Dept: URGENT CARE | Facility: CLINIC | Age: 69
End: 2024-07-18
Payer: MEDICARE

## 2024-07-18 VITALS
HEIGHT: 63 IN | HEART RATE: 58 BPM | TEMPERATURE: 98.9 F | WEIGHT: 117 LBS | RESPIRATION RATE: 18 BRPM | DIASTOLIC BLOOD PRESSURE: 70 MMHG | SYSTOLIC BLOOD PRESSURE: 132 MMHG | BODY MASS INDEX: 20.73 KG/M2 | OXYGEN SATURATION: 98 %

## 2024-07-18 VITALS
DIASTOLIC BLOOD PRESSURE: 101 MMHG | HEIGHT: 63 IN | SYSTOLIC BLOOD PRESSURE: 179 MMHG | OXYGEN SATURATION: 97 % | TEMPERATURE: 99 F | HEART RATE: 64 BPM | RESPIRATION RATE: 18 BRPM | BODY MASS INDEX: 21.95 KG/M2 | WEIGHT: 123.9 LBS

## 2024-07-18 DIAGNOSIS — S09.90XA CLOSED HEAD INJURY, INITIAL ENCOUNTER: ICD-10-CM

## 2024-07-18 DIAGNOSIS — S09.90XA INJURY OF HEAD, INITIAL ENCOUNTER: ICD-10-CM

## 2024-07-18 DIAGNOSIS — S00.11XA CONTUSION OF RIGHT EYELID, INITIAL ENCOUNTER: ICD-10-CM

## 2024-07-18 PROCEDURE — 99205 OFFICE O/P NEW HI 60 MIN: CPT

## 2024-07-18 PROCEDURE — 3075F SYST BP GE 130 - 139MM HG: CPT

## 2024-07-18 PROCEDURE — 3078F DIAST BP <80 MM HG: CPT

## 2024-07-18 PROCEDURE — 70450 CT HEAD/BRAIN W/O DYE: CPT

## 2024-07-18 PROCEDURE — 99283 EMERGENCY DEPT VISIT LOW MDM: CPT | Mod: 25

## 2024-07-18 ASSESSMENT — ENCOUNTER SYMPTOMS
DOUBLE VISION: 0
WHEEZING: 0
FOCAL WEAKNESS: 0
SENSORY CHANGE: 0
NAUSEA: 0
SORE THROAT: 0
SPEECH CHANGE: 0
DIZZINESS: 0
BLURRED VISION: 0
MYALGIAS: 0
TREMORS: 0
DIARRHEA: 0
SHORTNESS OF BREATH: 0
LOSS OF CONSCIOUSNESS: 0
SEIZURES: 0
PALPITATIONS: 0
PHOTOPHOBIA: 0
CHILLS: 0
HALLUCINATIONS: 0
COUGH: 0
HEADACHES: 1
STRIDOR: 0
DEPRESSION: 0
EYE DISCHARGE: 0
MEMORY LOSS: 0
ABDOMINAL PAIN: 0
WEAKNESS: 0
EYE PAIN: 0
TINGLING: 0
NERVOUS/ANXIOUS: 0
SPUTUM PRODUCTION: 0
VOMITING: 0
EYE REDNESS: 0
FEVER: 0

## 2024-07-18 ASSESSMENT — VISUAL ACUITY: OU: 1

## 2024-08-05 ENCOUNTER — HOSPITAL ENCOUNTER (OUTPATIENT)
Dept: LAB | Facility: MEDICAL CENTER | Age: 69
End: 2024-08-05
Attending: INTERNAL MEDICINE
Payer: MEDICARE

## 2024-08-05 LAB
ALBUMIN SERPL BCP-MCNC: 4.4 G/DL (ref 3.2–4.9)
ALBUMIN/GLOB SERPL: 1.8 G/DL
ALP SERPL-CCNC: 87 U/L (ref 30–99)
ALT SERPL-CCNC: 19 U/L (ref 2–50)
ANION GAP SERPL CALC-SCNC: 10 MMOL/L (ref 7–16)
AST SERPL-CCNC: 27 U/L (ref 12–45)
BASOPHILS # BLD AUTO: 0.8 % (ref 0–1.8)
BASOPHILS # BLD: 0.04 K/UL (ref 0–0.12)
BILIRUB SERPL-MCNC: 0.4 MG/DL (ref 0.1–1.5)
BUN SERPL-MCNC: 23 MG/DL (ref 8–22)
CALCIUM ALBUM COR SERPL-MCNC: 9.4 MG/DL (ref 8.5–10.5)
CALCIUM SERPL-MCNC: 9.7 MG/DL (ref 8.4–10.2)
CHLORIDE SERPL-SCNC: 100 MMOL/L (ref 96–112)
CO2 SERPL-SCNC: 27 MMOL/L (ref 20–33)
CREAT SERPL-MCNC: 0.87 MG/DL (ref 0.5–1.4)
EOSINOPHIL # BLD AUTO: 0.09 K/UL (ref 0–0.51)
EOSINOPHIL NFR BLD: 1.8 % (ref 0–6.9)
ERYTHROCYTE [DISTWIDTH] IN BLOOD BY AUTOMATED COUNT: 47.4 FL (ref 35.9–50)
FASTING STATUS PATIENT QL REPORTED: NORMAL
GFR SERPLBLD CREATININE-BSD FMLA CKD-EPI: 72 ML/MIN/1.73 M 2
GLOBULIN SER CALC-MCNC: 2.4 G/DL (ref 1.9–3.5)
GLUCOSE SERPL-MCNC: 85 MG/DL (ref 65–99)
HCT VFR BLD AUTO: 41.8 % (ref 37–47)
HGB BLD-MCNC: 14.1 G/DL (ref 12–16)
IMM GRANULOCYTES # BLD AUTO: 0.01 K/UL (ref 0–0.11)
IMM GRANULOCYTES NFR BLD AUTO: 0.2 % (ref 0–0.9)
LYMPHOCYTES # BLD AUTO: 1.46 K/UL (ref 1–4.8)
LYMPHOCYTES NFR BLD: 28.9 % (ref 22–41)
MCH RBC QN AUTO: 32.9 PG (ref 27–33)
MCHC RBC AUTO-ENTMCNC: 33.7 G/DL (ref 32.2–35.5)
MCV RBC AUTO: 97.4 FL (ref 81.4–97.8)
MONOCYTES # BLD AUTO: 0.43 K/UL (ref 0–0.85)
MONOCYTES NFR BLD AUTO: 8.5 % (ref 0–13.4)
NEUTROPHILS # BLD AUTO: 3.02 K/UL (ref 1.82–7.42)
NEUTROPHILS NFR BLD: 59.8 % (ref 44–72)
NRBC # BLD AUTO: 0 K/UL
NRBC BLD-RTO: 0 /100 WBC (ref 0–0.2)
PLATELET # BLD AUTO: 243 K/UL (ref 164–446)
PMV BLD AUTO: 9.2 FL (ref 9–12.9)
POTASSIUM SERPL-SCNC: 5.5 MMOL/L (ref 3.6–5.5)
PROT SERPL-MCNC: 6.8 G/DL (ref 6–8.2)
RBC # BLD AUTO: 4.29 M/UL (ref 4.2–5.4)
SODIUM SERPL-SCNC: 137 MMOL/L (ref 135–145)
WBC # BLD AUTO: 5.1 K/UL (ref 4.8–10.8)

## 2024-08-05 PROCEDURE — 85025 COMPLETE CBC W/AUTO DIFF WBC: CPT

## 2024-08-05 PROCEDURE — 36415 COLL VENOUS BLD VENIPUNCTURE: CPT

## 2024-08-05 PROCEDURE — 80053 COMPREHEN METABOLIC PANEL: CPT

## 2024-11-01 ENCOUNTER — HOSPITAL ENCOUNTER (OUTPATIENT)
Dept: RADIOLOGY | Facility: MEDICAL CENTER | Age: 69
End: 2024-11-01
Attending: SURGERY
Payer: MEDICARE

## 2024-11-01 DIAGNOSIS — I87.1 MAY-THURNER SYNDROME: ICD-10-CM

## 2024-11-01 PROCEDURE — 93978 VASCULAR STUDY: CPT

## 2024-11-01 PROCEDURE — 93978 VASCULAR STUDY: CPT | Mod: 26 | Performed by: INTERNAL MEDICINE

## 2024-11-12 ENCOUNTER — OFFICE VISIT (OUTPATIENT)
Dept: VASCULAR SURGERY | Facility: MEDICAL CENTER | Age: 69
End: 2024-11-12
Payer: MEDICARE

## 2024-11-12 VITALS
HEART RATE: 74 BPM | OXYGEN SATURATION: 97 % | HEIGHT: 63 IN | WEIGHT: 116 LBS | TEMPERATURE: 99.7 F | SYSTOLIC BLOOD PRESSURE: 104 MMHG | BODY MASS INDEX: 20.55 KG/M2 | DIASTOLIC BLOOD PRESSURE: 58 MMHG

## 2024-11-12 DIAGNOSIS — I87.1 MAY-THURNER SYNDROME: ICD-10-CM

## 2024-11-12 PROCEDURE — 3074F SYST BP LT 130 MM HG: CPT | Performed by: SURGERY

## 2024-11-12 PROCEDURE — 99212 OFFICE O/P EST SF 10 MIN: CPT | Performed by: SURGERY

## 2024-11-12 PROCEDURE — 3078F DIAST BP <80 MM HG: CPT | Performed by: SURGERY

## 2024-11-12 ASSESSMENT — FIBROSIS 4 INDEX: FIB4 SCORE: 1.758853959674306872

## 2024-11-12 NOTE — PROGRESS NOTES
VASCULAR SURGERY                 Clinic Progress Note  _________________________________    Vitals for Today's Visit (11/12/2024)  There were no vitals taken for this visit.  _________________________________          11/12/2024    Irma Vicente is a 67 y.o. 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  The patient's been in surveillance and done well since that time.    Patient said no change in her clinical status over the past year.  Noninvasive venous studies demonstrate that the stent is widely patent.  See discussion below    Venous duplex 11/1/2024  The inferior vena cava appears patent with normal Doppler flow.    Bilateral common and external iliac veins appear patent with normal Doppler    flow.    Stent seen in left common iliac vein appears patent.     Impression    Status post left iliac vein stenting for May Thurner    Patient doing well.  Will push out her surveillance studies to 2 years.  Patient was instructed that if she developed any symptoms of leg swelling or discomfort she is to see me sooner    Danny Esparza MD  [facility]  [phone] [address], [place] [address]

## (undated) DEVICE — SOLUTION PREP PVP IODINE 3/4 OZ POUCH PACKET CONTAINER STERILE LATEX FREE

## (undated) DEVICE — SUTURE GENERAL

## (undated) DEVICE — SENSOR OXIMETER ADULT SPO2 RD SET (20EA/BX)

## (undated) DEVICE — DEVICE SUTURING RIGHT CURVED SLINGSHOT (1EA)

## (undated) DEVICE — ABLATOR WAND SERFAS 90-S CRUISE

## (undated) DEVICE — DRAPE IOBAN II INCISE 23X17 - (10EA/BX 4BX/CA)

## (undated) DEVICE — GLOVE BIOGEL SZ 7 SURGICAL PF LTX - (50PR/BX 4BX/CA)

## (undated) DEVICE — DRAPE U SPLIT IMP 54 X 76 - (24/CA)

## (undated) DEVICE — SHAVER, 5.5 RESECTOR

## (undated) DEVICE — CHLORAPREP 26 ML APPLICATOR - ORANGE TINT(25/CA)

## (undated) DEVICE — COVER LIGHT HANDLE FLEXIBLE - SOFT (2EA/PK 80PK/CA)

## (undated) DEVICE — SUTURE 3-0 ETHILON FS-1 - (36/BX) 30 INCH

## (undated) DEVICE — DRAPE SHOULDER FLUID CONTROL - 77 X 85 (10/CA)

## (undated) DEVICE — SLEEVE SHOULDER DISP(ARTHREX) - (6/BX)

## (undated) DEVICE — PACK SHOULDER ARTHROSCOPY SM - (2EA/CA)

## (undated) DEVICE — TUBE E-T HI-LO CUFF 7.0MM (10EA/PK)

## (undated) DEVICE — TOWEL STOP TIMEOUT SAFETY FLAG (40EA/CA)

## (undated) DEVICE — CANNULA THREADED 5X75 (5EA/BX)

## (undated) DEVICE — TUBING DAY USE W/CARTRIDGE (10EA/BX)

## (undated) DEVICE — NEEDLE W/FACET TIP DULL VERSION W/STIMULATION CABLE SONOPLEX 21G X 4 (10/EA)"

## (undated) DEVICE — SODIUM CHL. IRRIGATION 0.9% 3000ML (4EA/CA 65CA/PF)

## (undated) DEVICE — SUTURE 2.0MM TAPE VIOLET MUST ORDER 12 EACH AT A TIME

## (undated) DEVICE — GLOVE 7.0 LF PF PROTEXIS (50PR/BX)

## (undated) DEVICE — TUBING CASSETTE CROSSFLOW INTEGRATED (10EA/CA)

## (undated) DEVICE — SHAVER4.0 AGGRESSIVE + FORMLA (5EA/BX)

## (undated) DEVICE — SYRINGE ASEPTO - (50EA/CA